# Patient Record
Sex: FEMALE | Race: WHITE | NOT HISPANIC OR LATINO | Employment: OTHER | URBAN - METROPOLITAN AREA
[De-identification: names, ages, dates, MRNs, and addresses within clinical notes are randomized per-mention and may not be internally consistent; named-entity substitution may affect disease eponyms.]

---

## 2020-07-30 ENCOUNTER — HISTORICAL (OUTPATIENT)
Dept: ADMINISTRATIVE | Facility: HOSPITAL | Age: 80
End: 2020-07-30

## 2021-10-22 ENCOUNTER — HISTORICAL (OUTPATIENT)
Dept: RADIOLOGY | Facility: HOSPITAL | Age: 81
End: 2021-10-22

## 2021-12-06 ENCOUNTER — PATIENT MESSAGE (OUTPATIENT)
Dept: RESEARCH | Facility: HOSPITAL | Age: 81
End: 2021-12-06

## 2021-12-07 ENCOUNTER — HISTORICAL (OUTPATIENT)
Dept: ADMINISTRATIVE | Facility: HOSPITAL | Age: 81
End: 2021-12-07

## 2021-12-07 LAB
ABS NEUT (OLG): 3.2 X10(3)/MCL (ref 2.1–9.2)
BASOPHILS # BLD AUTO: 0 X10(3)/MCL (ref 0–0.2)
BASOPHILS NFR BLD AUTO: 0 %
BUN SERPL-MCNC: 19.8 MG/DL (ref 9.8–20.1)
CALCIUM SERPL-MCNC: 9.2 MG/DL (ref 8.7–10.5)
CHLORIDE SERPL-SCNC: 105 MMOL/L (ref 98–107)
CO2 SERPL-SCNC: 28 MMOL/L (ref 23–31)
CREAT SERPL-MCNC: 1.12 MG/DL (ref 0.55–1.02)
CREAT/UREA NIT SERPL: 18
EOSINOPHIL # BLD AUTO: 0.2 X10(3)/MCL (ref 0–0.9)
EOSINOPHIL NFR BLD AUTO: 3 %
ERYTHROCYTE [DISTWIDTH] IN BLOOD BY AUTOMATED COUNT: 12.8 % (ref 11.5–17)
GLUCOSE SERPL-MCNC: 126 MG/DL (ref 82–115)
HCT VFR BLD AUTO: 36.9 % (ref 37–47)
HGB BLD-MCNC: 12.2 GM/DL (ref 12–16)
LYMPHOCYTES # BLD AUTO: 1.7 X10(3)/MCL (ref 0.6–4.6)
LYMPHOCYTES NFR BLD AUTO: 31 %
MCH RBC QN AUTO: 29.9 PG (ref 27–31)
MCHC RBC AUTO-ENTMCNC: 33.1 GM/DL (ref 33–36)
MCV RBC AUTO: 90.4 FL (ref 80–94)
MONOCYTES # BLD AUTO: 0.5 X10(3)/MCL (ref 0.1–1.3)
MONOCYTES NFR BLD AUTO: 9 %
NEUTROPHILS # BLD AUTO: 3.2 X10(3)/MCL (ref 2.1–9.2)
NEUTROPHILS NFR BLD AUTO: 57 %
PLATELET # BLD AUTO: 196 X10(3)/MCL (ref 130–400)
PMV BLD AUTO: 10.3 FL (ref 9.4–12.4)
POTASSIUM SERPL-SCNC: 3.6 MMOL/L (ref 3.5–5.1)
RBC # BLD AUTO: 4.08 X10(6)/MCL (ref 4.2–5.4)
SODIUM SERPL-SCNC: 142 MMOL/L (ref 136–145)
WBC # SPEC AUTO: 5.6 X10(3)/MCL (ref 4.5–11.5)

## 2021-12-17 LAB
ABS NEUT (OLG): 8.61 X10(3)/MCL (ref 2.1–9.2)
ALBUMIN SERPL-MCNC: 3.3 GM/DL (ref 3.4–4.8)
ALBUMIN/GLOB SERPL: 1.4 RATIO (ref 1.1–2)
ALP SERPL-CCNC: 85 UNIT/L (ref 40–150)
ALT SERPL-CCNC: 17 UNIT/L (ref 0–55)
AST SERPL-CCNC: 14 UNIT/L (ref 5–34)
BASOPHILS # BLD AUTO: 0.02 X10(3)/MCL (ref 0–0.2)
BASOPHILS NFR BLD AUTO: 0.2 % (ref 0–1)
BILIRUB SERPL-MCNC: 1.3 MG/DL (ref 0.2–1.2)
BILIRUBIN DIRECT+TOT PNL SERPL-MCNC: 0.5 MG/DL (ref 0–0.5)
BILIRUBIN DIRECT+TOT PNL SERPL-MCNC: 0.8 MG/DL (ref 0–0.8)
BUN SERPL-MCNC: 40.1 MG/DL (ref 9.8–20.1)
CALCIUM SERPL-MCNC: 9.1 MG/DL (ref 8.4–10.2)
CHLORIDE SERPL-SCNC: 99 MMOL/L (ref 98–107)
CO2 SERPL-SCNC: 30 MMOL/L (ref 23–31)
CREAT SERPL-MCNC: 1.29 MG/DL (ref 0.57–1.11)
DEPRECATED CALCIDIOL+CALCIFEROL SERPL-MC: 45.2 NG/ML (ref 30–80)
EOSINOPHIL # BLD AUTO: 0.04 X10(3)/MCL (ref 0–0.9)
EOSINOPHIL NFR BLD AUTO: 0.3 % (ref 0–6.4)
ERYTHROCYTE [DISTWIDTH] IN BLOOD BY AUTOMATED COUNT: 12.6 % (ref 11.5–17)
EST. AVERAGE GLUCOSE BLD GHB EST-MCNC: 171.4 MG/DL
GLOBULIN SER-MCNC: 2.3 GM/DL (ref 2.4–3.5)
GLUCOSE SERPL-MCNC: 226 MG/DL (ref 82–115)
HBA1C MFR BLD: 7.6 %
HCT VFR BLD AUTO: 38.8 % (ref 37–47)
HGB BLD-MCNC: 13 GM/DL (ref 12–16)
IMM GRANULOCYTES # BLD AUTO: 0.07 10*3/UL (ref 0–0.02)
IMM GRANULOCYTES NFR BLD AUTO: 0.6 % (ref 0–0.43)
LYMPHOCYTES # BLD AUTO: 2.31 X10(3)/MCL (ref 0.6–4.6)
LYMPHOCYTES NFR BLD AUTO: 19.2 % (ref 16–44)
MCH RBC QN AUTO: 30.4 PG (ref 27–31)
MCHC RBC AUTO-ENTMCNC: 33.5 GM/DL (ref 33–36)
MCV RBC AUTO: 90.7 FL (ref 80–94)
MONOCYTES # BLD AUTO: 1 X10(3)/MCL (ref 0.1–1.3)
MONOCYTES NFR BLD AUTO: 8.3 % (ref 4–12.1)
NEUTROPHILS # BLD AUTO: 8.61 X10(3)/MCL (ref 2.1–9.2)
NEUTROPHILS NFR BLD AUTO: 71.4 % (ref 43–73)
NRBC BLD AUTO-RTO: 0 % (ref 0–0.2)
PLATELET # BLD AUTO: 236 X10(3)/MCL (ref 130–400)
PMV BLD AUTO: 10.5 FL (ref 7.4–10.4)
POTASSIUM SERPL-SCNC: 3.9 MMOL/L (ref 3.5–5.1)
PREALB SERPL-MCNC: 21.7 MG/DL (ref 14–37)
PROT SERPL-MCNC: 5.6 GM/DL (ref 5.8–7.6)
RBC # BLD AUTO: 4.28 X10(6)/MCL (ref 4.2–5.4)
SODIUM SERPL-SCNC: 139 MMOL/L (ref 136–145)
TSH SERPL-ACNC: 0.49 UIU/ML (ref 0.35–4.94)
VIT B12 SERPL-MCNC: >2000 PG/ML (ref 213–816)
WBC # SPEC AUTO: 12 X10(3)/MCL (ref 4.5–11.5)

## 2021-12-18 LAB
ABS NEUT (OLG): 7.19 X10(3)/MCL (ref 2.1–9.2)
BASOPHILS # BLD AUTO: 0.02 X10(3)/MCL (ref 0–0.2)
BASOPHILS NFR BLD AUTO: 0.2 % (ref 0–1)
BUN SERPL-MCNC: 29.2 MG/DL (ref 9.8–20.1)
CALCIUM SERPL-MCNC: 9.1 MG/DL (ref 8.4–10.2)
CHLORIDE SERPL-SCNC: 101 MMOL/L (ref 98–107)
CO2 SERPL-SCNC: 31 MMOL/L (ref 23–31)
CREAT SERPL-MCNC: 1.2 MG/DL (ref 0.57–1.11)
CREAT/UREA NIT SERPL: 24
EOSINOPHIL # BLD AUTO: 0.25 X10(3)/MCL (ref 0–0.9)
EOSINOPHIL NFR BLD AUTO: 2 % (ref 0–6.4)
ERYTHROCYTE [DISTWIDTH] IN BLOOD BY AUTOMATED COUNT: 13 % (ref 11.5–17)
GLUCOSE SERPL-MCNC: 102 MG/DL (ref 82–115)
HCT VFR BLD AUTO: 42.8 % (ref 37–47)
HGB BLD-MCNC: 14 GM/DL (ref 12–16)
IMM GRANULOCYTES # BLD AUTO: 0.11 10*3/UL (ref 0–0.02)
IMM GRANULOCYTES NFR BLD AUTO: 0.9 % (ref 0–0.43)
LYMPHOCYTES # BLD AUTO: 3.59 X10(3)/MCL (ref 0.6–4.6)
LYMPHOCYTES NFR BLD AUTO: 28.9 % (ref 16–44)
MCH RBC QN AUTO: 30.4 PG (ref 27–31)
MCHC RBC AUTO-ENTMCNC: 32.7 GM/DL (ref 33–36)
MCV RBC AUTO: 93 FL (ref 80–94)
MONOCYTES # BLD AUTO: 1.25 X10(3)/MCL (ref 0.1–1.3)
MONOCYTES NFR BLD AUTO: 10.1 % (ref 4–12.1)
NEUTROPHILS # BLD AUTO: 7.19 X10(3)/MCL (ref 2.1–9.2)
NEUTROPHILS NFR BLD AUTO: 57.9 % (ref 43–73)
NRBC BLD AUTO-RTO: 0 % (ref 0–0.2)
PLATELET # BLD AUTO: 265 X10(3)/MCL (ref 130–400)
PMV BLD AUTO: 10 FL (ref 7.4–10.4)
POTASSIUM SERPL-SCNC: 4 MMOL/L (ref 3.5–5.1)
RBC # BLD AUTO: 4.6 X10(6)/MCL (ref 4.2–5.4)
SODIUM SERPL-SCNC: 144 MMOL/L (ref 136–145)
WBC # SPEC AUTO: 12.4 X10(3)/MCL (ref 4.5–11.5)

## 2021-12-20 LAB
ABS NEUT (OLG): 5.54 X10(3)/MCL (ref 2.1–9.2)
ALBUMIN SERPL-MCNC: 3.1 GM/DL (ref 3.4–4.8)
ALBUMIN/GLOB SERPL: 1.3 RATIO (ref 1.1–2)
ALP SERPL-CCNC: 91 UNIT/L (ref 40–150)
ALT SERPL-CCNC: 16 UNIT/L (ref 0–55)
AST SERPL-CCNC: 17 UNIT/L (ref 5–34)
BASOPHILS # BLD AUTO: 0.01 X10(3)/MCL (ref 0–0.2)
BASOPHILS NFR BLD AUTO: 0.1 % (ref 0–1)
BILIRUB SERPL-MCNC: 1.3 MG/DL (ref 0.2–1.2)
BILIRUBIN DIRECT+TOT PNL SERPL-MCNC: 0.4 MG/DL (ref 0–0.5)
BILIRUBIN DIRECT+TOT PNL SERPL-MCNC: 0.9 MG/DL (ref 0–0.8)
BUN SERPL-MCNC: 26 MG/DL (ref 9.8–20.1)
CALCIUM SERPL-MCNC: 9 MG/DL (ref 8.4–10.2)
CHLORIDE SERPL-SCNC: 104 MMOL/L (ref 98–107)
CHOLEST SERPL-MCNC: 135 MG/DL
CHOLEST/HDLC SERPL: 4 {RATIO} (ref 0–5)
CO2 SERPL-SCNC: 31 MMOL/L (ref 23–31)
CREAT SERPL-MCNC: 1.12 MG/DL (ref 0.57–1.11)
EOSINOPHIL # BLD AUTO: 0.4 X10(3)/MCL (ref 0–0.9)
EOSINOPHIL NFR BLD AUTO: 4.2 % (ref 0–6.4)
ERYTHROCYTE [DISTWIDTH] IN BLOOD BY AUTOMATED COUNT: 13.1 % (ref 11.5–17)
GLOBULIN SER-MCNC: 2.3 GM/DL (ref 2.4–3.5)
GLUCOSE SERPL-MCNC: 81 MG/DL (ref 82–115)
HCT VFR BLD AUTO: 40.8 % (ref 37–47)
HDLC SERPL-MCNC: 33 MG/DL (ref 40–60)
HGB BLD-MCNC: 12.9 GM/DL (ref 12–16)
IMM GRANULOCYTES # BLD AUTO: 0.09 10*3/UL (ref 0–0.02)
IMM GRANULOCYTES NFR BLD AUTO: 0.9 % (ref 0–0.43)
LDLC SERPL CALC-MCNC: 80 MG/DL (ref 50–140)
LYMPHOCYTES # BLD AUTO: 2.64 X10(3)/MCL (ref 0.6–4.6)
LYMPHOCYTES NFR BLD AUTO: 27.6 % (ref 16–44)
MCH RBC QN AUTO: 29.7 PG (ref 27–31)
MCHC RBC AUTO-ENTMCNC: 31.6 GM/DL (ref 33–36)
MCV RBC AUTO: 94 FL (ref 80–94)
MONOCYTES # BLD AUTO: 0.87 X10(3)/MCL (ref 0.1–1.3)
MONOCYTES NFR BLD AUTO: 9.1 % (ref 4–12.1)
NEUTROPHILS # BLD AUTO: 5.54 X10(3)/MCL (ref 2.1–9.2)
NEUTROPHILS NFR BLD AUTO: 58.1 % (ref 43–73)
NRBC BLD AUTO-RTO: 0 % (ref 0–0.2)
PLATELET # BLD AUTO: 223 X10(3)/MCL (ref 130–400)
PMV BLD AUTO: 10.1 FL (ref 7.4–10.4)
POTASSIUM SERPL-SCNC: 4.7 MMOL/L (ref 3.5–5.1)
PREALB SERPL-MCNC: 20.8 MG/DL (ref 14–37)
PROT SERPL-MCNC: 5.4 GM/DL (ref 5.8–7.6)
RBC # BLD AUTO: 4.34 X10(6)/MCL (ref 4.2–5.4)
SODIUM SERPL-SCNC: 143 MMOL/L (ref 136–145)
TRIGL SERPL-MCNC: 112 MG/DL (ref 0–150)
VLDLC SERPL CALC-MCNC: 22 MG/DL
WBC # SPEC AUTO: 9.6 X10(3)/MCL (ref 4.5–11.5)

## 2021-12-23 ENCOUNTER — HISTORICAL (OUTPATIENT)
Dept: ADMINISTRATIVE | Facility: HOSPITAL | Age: 81
End: 2021-12-23

## 2021-12-23 LAB
ABS NEUT (OLG): 3.92 X10(3)/MCL (ref 2.1–9.2)
ALBUMIN SERPL-MCNC: 3.2 GM/DL (ref 3.4–4.8)
ALBUMIN/GLOB SERPL: 1.4 RATIO (ref 1.1–2)
ALP SERPL-CCNC: 99 UNIT/L (ref 40–150)
ALT SERPL-CCNC: 18 UNIT/L (ref 0–55)
AST SERPL-CCNC: 16 UNIT/L (ref 5–34)
BASOPHILS # BLD AUTO: 0.02 X10(3)/MCL (ref 0–0.2)
BASOPHILS NFR BLD AUTO: 0.3 % (ref 0–1)
BILIRUB SERPL-MCNC: 1.3 MG/DL (ref 0.2–1.2)
BILIRUBIN DIRECT+TOT PNL SERPL-MCNC: 0.5 MG/DL (ref 0–0.5)
BILIRUBIN DIRECT+TOT PNL SERPL-MCNC: 0.8 MG/DL (ref 0–0.8)
BUN SERPL-MCNC: 22.8 MG/DL (ref 9.8–20.1)
CALCIUM SERPL-MCNC: 9.3 MG/DL (ref 8.4–10.2)
CHLORIDE SERPL-SCNC: 104 MMOL/L (ref 98–107)
CO2 SERPL-SCNC: 31 MMOL/L (ref 23–31)
CREAT SERPL-MCNC: 1.16 MG/DL (ref 0.57–1.11)
EOSINOPHIL # BLD AUTO: 0.28 X10(3)/MCL (ref 0–0.9)
EOSINOPHIL NFR BLD AUTO: 3.8 % (ref 0–6.4)
ERYTHROCYTE [DISTWIDTH] IN BLOOD BY AUTOMATED COUNT: 13.2 % (ref 11.5–17)
GLOBULIN SER-MCNC: 2.3 GM/DL (ref 2.4–3.5)
GLUCOSE SERPL-MCNC: 189 MG/DL (ref 82–115)
HCT VFR BLD AUTO: 36.7 % (ref 37–47)
HGB BLD-MCNC: 11.8 GM/DL (ref 12–16)
IMM GRANULOCYTES # BLD AUTO: 0.03 10*3/UL (ref 0–0.02)
IMM GRANULOCYTES NFR BLD AUTO: 0.4 % (ref 0–0.43)
LYMPHOCYTES # BLD AUTO: 2.2 X10(3)/MCL (ref 0.6–4.6)
LYMPHOCYTES NFR BLD AUTO: 29.9 % (ref 16–44)
MCH RBC QN AUTO: 29.2 PG (ref 27–31)
MCHC RBC AUTO-ENTMCNC: 32.2 GM/DL (ref 33–36)
MCV RBC AUTO: 90.8 FL (ref 80–94)
MONOCYTES # BLD AUTO: 0.91 X10(3)/MCL (ref 0.1–1.3)
MONOCYTES NFR BLD AUTO: 12.4 % (ref 4–12.1)
NEUTROPHILS # BLD AUTO: 3.92 X10(3)/MCL (ref 2.1–9.2)
NEUTROPHILS NFR BLD AUTO: 53.2 % (ref 43–73)
NRBC BLD AUTO-RTO: 0 % (ref 0–0.2)
PLATELET # BLD AUTO: 190 X10(3)/MCL (ref 130–400)
PMV BLD AUTO: 10.5 FL (ref 7.4–10.4)
POTASSIUM SERPL-SCNC: 4.7 MMOL/L (ref 3.5–5.1)
PROT SERPL-MCNC: 5.5 GM/DL (ref 5.8–7.6)
RBC # BLD AUTO: 4.04 X10(6)/MCL (ref 4.2–5.4)
SODIUM SERPL-SCNC: 142 MMOL/L (ref 136–145)
WBC # SPEC AUTO: 7.4 X10(3)/MCL (ref 4.5–11.5)

## 2021-12-24 LAB
HCT VFR BLD AUTO: 37.2 % (ref 37–47)
HGB BLD-MCNC: 12.1 GM/DL (ref 12–16)

## 2021-12-27 LAB
ABS NEUT (OLG): 3.88 X10(3)/MCL (ref 2.1–9.2)
ALBUMIN SERPL-MCNC: 3.2 GM/DL (ref 3.4–4.8)
ALBUMIN/GLOB SERPL: 1.5 RATIO (ref 1.1–2)
ALP SERPL-CCNC: 124 UNIT/L (ref 40–150)
ALT SERPL-CCNC: 18 UNIT/L (ref 0–55)
AST SERPL-CCNC: 17 UNIT/L (ref 5–34)
BASOPHILS # BLD AUTO: 0.03 X10(3)/MCL (ref 0–0.2)
BASOPHILS NFR BLD AUTO: 0.4 % (ref 0–1)
BILIRUB SERPL-MCNC: 0.6 MG/DL (ref 0.2–1.2)
BILIRUBIN DIRECT+TOT PNL SERPL-MCNC: 0.2 MG/DL (ref 0–0.5)
BILIRUBIN DIRECT+TOT PNL SERPL-MCNC: 0.4 MG/DL (ref 0–0.8)
BUN SERPL-MCNC: 16.7 MG/DL (ref 9.8–20.1)
CALCIUM SERPL-MCNC: 9.5 MG/DL (ref 8.4–10.2)
CHLORIDE SERPL-SCNC: 104 MMOL/L (ref 98–107)
CO2 SERPL-SCNC: 29 MMOL/L (ref 23–31)
CREAT SERPL-MCNC: 1.06 MG/DL (ref 0.57–1.11)
EOSINOPHIL # BLD AUTO: 0.26 X10(3)/MCL (ref 0–0.9)
EOSINOPHIL NFR BLD AUTO: 3.8 % (ref 0–6.4)
ERYTHROCYTE [DISTWIDTH] IN BLOOD BY AUTOMATED COUNT: 13.1 % (ref 11.5–17)
EST CREAT CLEARANCE SER (OHS): 40.51 ML/MIN
GLOBULIN SER-MCNC: 2.2 GM/DL (ref 2.4–3.5)
GLUCOSE SERPL-MCNC: 104 MG/DL (ref 82–115)
HCT VFR BLD AUTO: 36.3 % (ref 37–47)
HGB BLD-MCNC: 11.6 GM/DL (ref 12–16)
IMM GRANULOCYTES # BLD AUTO: 0.01 10*3/UL (ref 0–0.02)
IMM GRANULOCYTES NFR BLD AUTO: 0.1 % (ref 0–0.43)
LYMPHOCYTES # BLD AUTO: 1.98 X10(3)/MCL (ref 0.6–4.6)
LYMPHOCYTES NFR BLD AUTO: 29 % (ref 16–44)
MCH RBC QN AUTO: 29.2 PG (ref 27–31)
MCHC RBC AUTO-ENTMCNC: 32 GM/DL (ref 33–36)
MCV RBC AUTO: 91.4 FL (ref 80–94)
MONOCYTES # BLD AUTO: 0.67 X10(3)/MCL (ref 0.1–1.3)
MONOCYTES NFR BLD AUTO: 9.8 % (ref 4–12.1)
NEUTROPHILS # BLD AUTO: 3.88 X10(3)/MCL (ref 2.1–9.2)
NEUTROPHILS NFR BLD AUTO: 56.9 % (ref 43–73)
NRBC BLD AUTO-RTO: 0 % (ref 0–0.2)
PLATELET # BLD AUTO: 180 X10(3)/MCL (ref 130–400)
PMV BLD AUTO: 10.3 FL (ref 7.4–10.4)
POTASSIUM SERPL-SCNC: 4.2 MMOL/L (ref 3.5–5.1)
PREALB SERPL-MCNC: 20.1 MG/DL (ref 14–37)
PROT SERPL-MCNC: 5.4 GM/DL (ref 5.8–7.6)
RBC # BLD AUTO: 3.97 X10(6)/MCL (ref 4.2–5.4)
SODIUM SERPL-SCNC: 143 MMOL/L (ref 136–145)
WBC # SPEC AUTO: 6.8 X10(3)/MCL (ref 4.5–11.5)

## 2022-02-08 ENCOUNTER — HISTORICAL (OUTPATIENT)
Dept: PREADMISSION TESTING | Facility: HOSPITAL | Age: 82
End: 2022-02-08

## 2022-02-09 LAB — SARS-COV-2 RNA RESP QL NAA+PROBE: NOT DETECTED

## 2022-02-11 ENCOUNTER — HISTORICAL (OUTPATIENT)
Dept: SURGERY | Facility: HOSPITAL | Age: 82
End: 2022-02-11

## 2022-02-11 LAB
CBG: 202 (ref 70–115)
CBG: 286 (ref 70–115)

## 2022-02-12 LAB
ABS NEUT (OLG): 4.86 (ref 2.1–9.2)
BASOPHILS # BLD AUTO: 0 10*3/UL (ref 0–0.2)
BASOPHILS NFR BLD AUTO: 0 %
EOSINOPHIL # BLD AUTO: 0.4 10*3/UL (ref 0–0.9)
EOSINOPHIL NFR BLD AUTO: 4 %
ERYTHROCYTE [DISTWIDTH] IN BLOOD BY AUTOMATED COUNT: 12.6 % (ref 11.5–17)
HCT VFR BLD AUTO: 37.4 % (ref 37–47)
HGB BLD-MCNC: 11.8 G/DL (ref 12–16)
LYMPHOCYTES # BLD AUTO: 1.9 10*3/UL (ref 0.6–4.6)
LYMPHOCYTES NFR BLD AUTO: 24 %
MANUAL DIFF? (OHS): NO
MCH RBC QN AUTO: 29.5 PG (ref 27–31)
MCHC RBC AUTO-ENTMCNC: 31.6 G/DL (ref 33–36)
MCV RBC AUTO: 93.5 FL (ref 80–94)
MONOCYTES # BLD AUTO: 0.7 10*3/UL (ref 0.1–1.3)
MONOCYTES NFR BLD AUTO: 9 %
NEUTROPHILS # BLD AUTO: 4.86 10*3/UL (ref 2.1–9.2)
NEUTROPHILS NFR BLD AUTO: 62 %
PLATELET # BLD AUTO: 201 10*3/UL (ref 130–400)
PMV BLD AUTO: 10.2 FL (ref 9.4–12.4)
RBC # BLD AUTO: 4 10*6/UL (ref 4.2–5.4)
WBC # SPEC AUTO: 7.8 10*3/UL (ref 4.5–11.5)

## 2022-04-08 ENCOUNTER — HISTORICAL (OUTPATIENT)
Dept: ADMINISTRATIVE | Facility: HOSPITAL | Age: 82
End: 2022-04-08

## 2022-04-10 ENCOUNTER — HISTORICAL (OUTPATIENT)
Dept: ADMINISTRATIVE | Facility: HOSPITAL | Age: 82
End: 2022-04-10

## 2022-04-27 VITALS
SYSTOLIC BLOOD PRESSURE: 128 MMHG | DIASTOLIC BLOOD PRESSURE: 72 MMHG | WEIGHT: 213.88 LBS | BODY MASS INDEX: 36.51 KG/M2 | HEIGHT: 64 IN

## 2022-04-30 NOTE — DISCHARGE SUMMARY
Patient:   Janee Costello             MRN: [id number]            FIN: [id number]               Age:   81 years     Sex:  Female     :  1940   Associated Diagnoses:   None   Author:   Arina Varner      Date of Service: 2021      Discharge Information      Discharge Summary Information   Date of Admission: 2021  Date of Discharge: 2021  Admit Diagnosis: Cervical spondylosis with myelopathy         Atrial fibrillation         CAD         Hypothyroidism         Obesity         Hyperlipidemia         Vertigo         Vitamin D deficiency         Vitamin B12 deficiency         Diabetes type 1         Hypertension         Right knee arthritis         Constipation  Discharge Diagnosis: Cervical spondylosis with myelopathy (stable)            Atrial fibrillation (stable)            CAD (stable)            Hypothyroidism (stable)            Obesity (stable)            Hyperlipidemia (stable)            Vertigo (stable)            Vitamin D deficiency (stable)            Vitamin B12 deficiency (stable)            Diabetes type 1 (stable)            Hypertension (stable)            Right knee arthritis (stable)            Constipation (stable)            Cholelithiasis (stable)     COVID-19 testing: Negative on 12/15/2021  COVID-19 vaccination status: Vaccinated - Pfizer 1/10/2021 and 2021    Internal Medicine (attending): Jones Raya MD  Physiatry (consulting): Jeff Martinez MD    OUTPATIENT PROVIDERS  PCP: Leigha Brown MD  Neurosurgery: Jam Thurstno MD  Cardiology: Kyle Turcios MD      Hospital Course   81-year-old WF presented to [facility] for scheduled back surgery 2/2 cervical spondylosis with cord compression.  PMH significant for right knee arthritis, atrial fibrillation, CAD, diabetes type 2, hypothyroidism, and hypertension.  On 12/10 tolerated ACDF C4-7 without perioperative complications.  Removed RADHA drain on .  On  reported more difficulty with  swallowing pills.  Initiated Decadron IVP x1 per neurosurgery.  Tolerated transfer to [facility] inpatient rehab unit on 12/16 without incident.    Throughout inpatient rehab course remained continent of bowel and bladder.  On 12/17 initiated amlodipine 5 mg daily.  Renal indices trended up slightly.  Initiated 1 L normal saline.  The remainder of rehab course renal indices improved.  On 12/20 required increase in amlodipine to 10 mg daily with improvement in blood pressure control.  Insulin pump began malfunctioning and discontinued.  Endocrinology recommended initiating Lantus 35 units at bedtime and insulin lispro 6 units 3 times a day prior to meals.  On 12/22 asymptomatic bradycardia when sleeping and decrease metoprolol to 25 mg daily.  Vital signs remained at goal throughout remainder of inpatient rehab course.  Reported abdominal pain on 12/22 improved after GI cocktail.  Abdominal ultrasound significant for previously diagnosed cholelithiasis, no ductal dilatation.  Abdominal pain resolved.  On 12/23 insulin pump began working.  Discontinued Lantus and insulin lispro along with ISS as #3.  Throughout remainder of inpatient rehab course CBGs remained at goal.  Bowel management, sleep hygiene, and appetite remained at goal.  RT reported overall supervision to set up an limited by standing tolerance.  PT reported overall standby assist/independent.  Ambulating 200 feet.  OT reported minimal to supervision set up assist with ADLs 2/2 decreased hand function.  Vital signs at goal.  Medication reconciliation completed.  Discharge orders initiated.  Stable for transfer home with home health and family care.  Follow-up with scheduled appointments documented below.     Chief Complaint: Cervical spondylosis with myelopathy s/p ACDF with allograft C4-7 on 12/10/2021       Physical Examination      Vital Signs (last 24 hrs)_____  Last Charted___________  Temp Oral      36.5 DegC  (DEC 28 05:16)  Heart Rate Peripheral   67  bpm  (DEC 28 05:16)  Resp Rate          18 br/min  (DEC 28 05:16)  SBP      H 145mmHg  (DEC 28 05:16)  DBP      72 mmHg  (DEC 28 05:16)  SpO2      94 %  (DEC 28 05:16)     General:  Alert and oriented, No acute distress.    Eye:  Pupils are equal, round and reactive to light, Vision unchanged.    HENT:  Normocephalic.    Neck:  Supple, Non-tender.    Respiratory:  Lungs are clear to auscultation, Respirations are non-labored, Breath sounds are equal.    Cardiovascular:  Normal rate, Regular rhythm, No murmur, No edema.    Gastrointestinal:  Soft, Non-tender, Normal bowel sounds.    Musculoskeletal:  Normal range of motion, Lower extremity weakness bilaterally, Bilateral upper extremity weakness right more than left, Decreased fine motor skills.  .    Integumentary:  Warm, Right clavicle surgical incision dry and intact, soft collar in place.  .    Neurologic:  Alert, Oriented, Normal sensory, Normal motor function, No focal deficits, Cranial Nerves II-XII are grossly intact.    Cognition and Speech:  Oriented, Speech clear and coherent, Functional cognition intact.    Psychiatric:  Cooperative, Appropriate mood & affect, Normal judgment, Non-suicidal.        Results Review   General results   Most recent results   Discrete results only   12/27/2021 6:11 CST      Est Creat Clearance Ser   40.51 mL/min    12/27/2021 5:00 CST      Blood Glucose, POC        150 mg/dL  HI    12/27/2021 4:55 CST      WBC                       6.8 x10(3)/mcL                             RBC                       3.97 x10(6)/mcL  LOW                             Hgb                       11.6 gm/dL  LOW                             Hct                       36.3 %  LOW                             Platelet                  180 x10(3)/mcL                             MCV                       91.4 fL                             MCH                       29.2 pg                             MCHC                      32.0 gm/dL  LOW                              RDW                       13.1 %                             MPV                       10.3 fL                             Abs Neut                  3.88 x10(3)/mcL                             Neutro Auto               56.9 %                             Lymph Auto                29.0 %                             Mono Auto                 9.8 %                             Eos Auto                  3.8 %                             Abs Eos                   0.26 x10(3)/mcL                             Basophil Auto             0.4 %                             Abs Neutro                3.88 x10(3)/mcL                             Abs Lymph                 1.98 x10(3)/mcL                             Abs Mono                  0.67 x10(3)/mcL                             Abs Baso                  0.03 x10(3)/mcL                             NRBC%                     0.0 %                             IG%                       0.100 %                             IG#                       0.0100                             Sodium Lvl                143 mmol/L                             Potassium Lvl             4.2 mmol/L                             Chloride                  104 mmol/L                             CO2                       29 mmol/L                             Calcium Lvl               9.5 mg/dL                             Glucose Lvl               104 mg/dL                             BUN                       16.7 mg/dL                             Creatinine                1.06 mg/dL                             eGFR-AA                   >60  NA                             eGFR-RUDI                  53 mL/min/1.73 m2  NA                             Bili Total                0.6 mg/dL                             Bili Direct               0.2 mg/dL                             Bili Indirect             0.40 mg/dL                             AST                       17 unit/L                              ALT                       18 unit/L                             Alk Phos                  124 unit/L                             Total Protein             5.4 gm/dL  LOW                             Albumin Lvl               3.2 gm/dL  LOW                             Globulin                  2.2 gm/dL  LOW                             A/G Ratio                 1.5 ratio                             Prealbumin                20.1 mg/dL        Radiology results   US, Abdomen , Reported at  12/23/2021 10:53:00, Interpretation:  Prominent liver at 18.8 cm. Changes suggestive of some degree of steatosis. Cholelithiasis       Discharge Plan   Discharge Summary Plan   Discharge Status: improved.        Location: Discharge to home with home health     Medications: See discharge medicine reconciliation     Activity: As tolerated     Diet: Diabetic diet     Instructions:  Take all medications as prescribed.          Attend appointments as scheduled.          Return to ED if symptoms worsen, or if t > 100.4.     Education: cervical spondylosis with myelopathy, atrial fibrillation, CAD     Follow-up: Leigha mcmahon MD within 1 to 2 weeks         Jam Thurston MD on 01/20/2022 at 9:00    Discussed plan of care, and patient communicated understanding. Agreed to comply with recommendations.    Discharge Time: 54 minutes

## 2022-05-03 NOTE — HISTORICAL [facility] CERNER
This is a historical note converted from Katlyn. Formatting and pictures may have been removed.  Please reference Katlyn for original formatting and attached multimedia. Chief Complaint  pt here for bilateral knee pain, prior left patella fx with sx 2014 and prior left TKA in 2016, states fell back in March 2020 and knees have hurt ever since, xrays today....sm  History of Present Illness  Knee symptoms ::knee gives way ??? Knee joint pain on the right ??? Worse with weightbearing ??? Worse in the morning  ??? Slowly worsens with extended activity ??? Is increased by bending it ??? By twisting it ??? By kneeling ??? With stairs ??? By squatting  ??? Knee joint swelling on the right??  ?? Knee joint pain not improved by rest ? ??? No clicking sensation in the right knee ??? No grating sensation in the right knee?  ??? The knee did not suddenly buckle due to contact ?? No popping sound was heard in the knee?  ??? No tingling ??? No burning sensation  Review of Systems  Review of Systems?  ?????Constitutional: ?No fever, No chills. ?  ?????Respiratory: ?No shortness of breath, No cough. ?  ?????Cardiovascular: ?No chest pain. ?  ?????Gastrointestinal: ?No nausea, No vomiting, No diarrhea, No constipation, No heartburn. ?  ?????Genitourinary: ?No dysuria, No hematuria. ?  ?????Hematology/Lymphatics: ?No bleeding tendency. ?  ?????Endocrine: ?No polyuria. ?  ?????Neurologic: ?Alert and oriented X4, No numbness, No tingling. ?  ?????Psychiatric: ?No anxiety, No depression. ?  ?????Integumentary: no abnormalities except as noted in history of present illness  Physical Exam  Vitals & Measurements  T:?98.8? ?F (Oral)? BP:?128/72?  HT:?162.00?cm? WT:?97.000?kg? BMI:?36.96?  PHYSICAL FINDINGS  Cardiovascular:  Arterial Pulses: ? Dorsalis pedis pulses were normal right. ? Dorsalis pedis pulses were normal right.  Musculoskeletal System:  Hips:  General/bilateral: ? No swelling of the hips. ? No induration of the hips.  Right  Hip: ? Motion was normal. ? No pain was elicited by active internal rotation with the hip flexed.  ? No pain was elicited by active external rotation with the hip flexed. ? No pain was elicited by active motion. ? No pain was elicited by passive motion.  Left Hip: ? Motion was normal.  Right Knee: ? Examined. ? Positive effusion. ?Localized swelling. ?Genu varum. ?Patella demonstrated crepitus. ?Anteromedial aspect was tender on palpation. ?Medial aspect was tender on palpation.  patella  Right Knee:  Knee Motion: Value Normal Range  Active flexion???????120?? degrees  Active extension??????10??degrees  ???  ? Pain was elicited throughout the range of motion. ? Swelling with a negative fluctuation test. ? No erythema. ? No warmth. ? Anterior aspect was not tender on palpation. ? Patellofemoral region was not tender on palpation. ? Medial collateral ligament was not tender on palpation. ? Lateral collateral ligament was not tender on palpation. ? No pain was elicited by motion using Aurora apprehension test. ? No laxity of the posterior cruciate ligament. ? No anterior drawer sign was present. ? No one plane medial (straight) instability. ? No one plane lateral (straight) instability. ? A Lachman test did not demonstrate one plane anterior instability. ? Clarkes sign was not observed for chondromalacia.  Left Knee:  Knee Motion: Value Normal Range  Active flexion??????120??? ?degrees  Active extension????5 degrees  ???  ??  TESTS  Imaging:  X-Ray Knee:  AP and lateral view x-rays of the right knee with sunrise view of the patella were performed -of right knee.  ???  IMPRESSIONS RADIOLOGY TEST  Narrowing of the right knee joint space bone on bone, showed sclerosis of the right knee, and osteophytes arising from the right knee.  ?  ?   left knee has intact prosthesis  ?   she can not use NSAIDS due to her anti-coagulation meds  can not use cortisone due to her diabetes  she does not want to try  visco-supplementation  ?   only option for her is TKA when she decides  ?  Assessment/Plan  1.?Arthritis of right knee?M17.11  2.?Status post left knee replacement?Z96.652  Referrals  Clinic Follow up, 07/30/20 13:32:00 CDT, prn, Arthritis of right knee  Status post left knee replacement, [facility]   Problem List/Past Medical History  Ongoing  Arthritis of right knee  Diabetes type  Obesity  Status post left knee replacement  Historical  No qualifying data  Medications  Baqsimi One Pack 3 mg nasal powder, 3 mg, Nasal, Once  diltiazem 120 mg/24 hours oral CAPsule extended release, 120 mg= 1 cap(s), Oral, Daily  Eliquis Starter Pack for Treatment of DVT and PE 5 mg oral tablet, 5 mg= 1 tab(s), Oral, BID  gabapentin 100 mg oral capsule, 100 mg= 1 cap(s), Oral, BID  levothyroxine 50 mcg (0.05 mg) oral tablet, 50 mcg= 1 tab(s), Oral, Daily  metoprolol succinate 50 mg oral tablet, extended release, 75 mg= 1.5 tab(s), Oral, Daily  pravastatin 20 mg oral tablet, 20 mg= 1 tab(s), Oral, Daily  ReliOn/NovoLIN R FlexPen 100 units/mL injectable solution, Subcutaneous  traMADol 50 mg oral tablet, 50 mg= 1 tab(s), Oral, q6hr, PRN  Allergies  lisinopril?(Swelling)  sulfa drugs?(Rash)  Social History  Tobacco  Never (less than 100 in lifetime), N/A, 07/30/2020  Health Maintenance  Health Maintenance  ???Pending?(in the next year)  ??? ??OverDue  ??? ? ? ?Advance Directive due??01/02/20??and every 1??year(s)  ??? ? ? ?Cognitive Screening due??01/02/20??and every 1??year(s)  ??? ? ? ?Functional Assessment due??01/02/20??and every 1??year(s)  ??? ??Due?  ??? ? ? ?ADL Screening due??08/04/20??and every 1??year(s)  ??? ? ? ?Aspirin Therapy for CVD Prevention due??08/04/20??and every 1??year(s)  ??? ? ? ?Bone Density Screening due??08/04/20??Variable frequency  ??? ? ? ?Depression Screening due??08/04/20??and every?  ??? ? ? ?Diabetes Maintenance-Fasting Lipid Profile due??08/04/20??Variable frequency  ??? ? ? ?Diabetes  Maintenance-Serum Creatinine due??08/04/20??Variable frequency  ??? ? ? ?Diabetes Screening due??08/04/20??and every?  ??? ? ? ?Influenza Vaccine due??08/04/20??and every?  ??? ? ? ?Lipid Screening due??08/04/20??and every?  ??? ? ? ?Pneumococcal Vaccine due??08/04/20??and every?  ??? ? ? ?Tetanus Vaccine due??08/04/20??and every 10??year(s)  ??? ? ? ?Zoster Vaccine due??08/04/20??and every?  ??? ??Due In Future?  ??? ? ? ?Obesity Screening not due until??01/01/21??and every 1??year(s)  ??? ? ? ?Fall Risk Assessment not due until??01/02/21??and every 1??year(s)  ??? ? ? ?Blood Pressure Screening not due until??07/30/21??and every 1??year(s)  ??? ? ? ?Body Mass Index Check not due until??07/30/21??and every 1??year(s)  ???Satisfied?(in the past 1 year)  ??? ??Satisfied?  ??? ? ? ?Blood Pressure Screening on??07/30/20.??Satisfied by Ara Chinchilla LPN  ??? ? ? ?Body Mass Index Check on??07/30/20.??Satisfied by Ara Chinchilla LPN  ??? ? ? ?Fall Risk Assessment on??07/30/20.??Satisfied by Ara Chinchilla LPN  ??? ? ? ?Obesity Screening on??07/30/20.??Satisfied by Ara Chinchilla LPN  ?

## 2022-05-05 ENCOUNTER — TELEPHONE (OUTPATIENT)
Dept: NEUROSURGERY | Facility: CLINIC | Age: 82
End: 2022-05-05

## 2022-05-14 NOTE — H&P
Patient:   Janee Costello             MRN: [id number]            FIN: [id number]               Age:   81 years     Sex:  Female     :  1940   Associated Diagnoses:   Cholelithiasis   Author:   Arina Juarez      Basic Information   Source of history:  Self.    History limitation:  None.       Chief Complaint   Abd pain      History of Present Illness   Ms. Costello is a 80 yo female that presents to [facility] for elective cholecystectomy. hx of symptomatic cholelithiasis. No new symptoms. ready to proceed.       Review of Systems   Constitutional:  Negative.    Eye:  Negative.    Ear/Nose/Mouth/Throat:  Negative.    Respiratory:  Negative.    Cardiovascular:  Negative.    Gastrointestinal:  Nausea, Abdominal pain.    Genitourinary:  Negative.    Gynecologic:  Negative.    Hematology/Lymphatics:  Negative.    Endocrine:  Negative.    Immunologic:  Negative.    Musculoskeletal:  Negative.    Integumentary:  Negative.    Neurologic:  Negative.    Psychiatric:  Negative.       Health Status   Allergies:    Allergic Reactions (Selected)  Severity Not Documented  Lisinopril- Swelling.  Sulfa drugs- Rash.   Current medications:  (Selected)   Inpatient Medications  Ordered  Ancef: 1 gm, form: Injection Surgical prophylaxis, IV Piggyback, On Call, Infuse over: 30 minute(s), first dose 22 6:10:00 CST  Lactated Ringers Injection intravenous solution 1,000 mL: 1,000 mL, 1,000 mL, IV, 75 mL/hr, start date 22 6:10:00 CST  Pending Complete  midazolam: 2 mg, form: Injection, IV Push, q5min, Order duration: 2 dose(s), first dose 22 6:15:00 CST, stop date 22 6:24:00 CST, (up to 5 mg for moderate anxiety)  Prescriptions  Prescribed  Vitamin D3 1000 intl units (25 mcg) oral tablet: 25 mcg = 1 tab(s), Oral, Daily, # 30 tab(s), 0 Refill(s), Pharmacy: [facility] #[id number], 168, cm, Height/Length Dosing, 21 14:40:00 CST, 102, kg, Weight Dosing, 21 14:40:00 CST  amlodipine 5 mg oral  tablet: 10 mg = 2 tab(s), Oral, Daily, # 60 tab(s), 0 Refill(s), Pharmacy: [facility] #[id number], 168, cm, Height/Length Dosing, 12/16/21 14:40:00 CST, 102, kg, Weight Dosing, 12/16/21 14:40:00 CST  apixaban 5 mg oral tablet: 5 mg = 1 tab(s), Oral, BID, # 60 tab(s), 0 Refill(s), Pharmacy: [facility] #[id number], 168, cm, Height/Length Dosing, 12/16/21 14:40:00 CST, 102, kg, Weight Dosing, 12/16/21 14:40:00 CST  cloniDINE 0.1 mg oral tablet: 0.2 mg = 2 tab(s), Oral, BID, # 120 tab(s), 0 Refill(s), Pharmacy: [facility] #[id number], 168, cm, Height/Length Dosing, 12/16/21 14:40:00 CST, 102, kg, Weight Dosing, 12/16/21 14:40:00 CST  diltiazem 120 mg/24 hours oral CAPsule extended release: 120 mg = 1 cap(s), Oral, Daily, # 30 cap(s), 0 Refill(s), Pharmacy: [facility] #[id number], 168, cm, Height/Length Dosing, 12/16/21 14:40:00 CST, 102, kg, Weight Dosing, 12/16/21 14:40:00 CST  furosemide 40 mg oral tablet: 40 mg = 1 tab(s), Oral, BID, # 60 tab(s), 0 Refill(s), Pharmacy: [facility] #[id number], 168, cm, Height/Length Dosing, 12/16/21 14:40:00 CST, 102, kg, Weight Dosing, 12/16/21 14:40:00 CST  gabapentin 300 mg oral capsule: 300 mg = 1 cap(s), Oral, TID, # 90 cap(s), 0 Refill(s), Pharmacy: [facility] #[id number], 168, cm, Height/Length Dosing, 12/16/21 14:40:00 CST, 102, kg, Weight Dosing, 12/16/21 14:40:00 CST  levothyroxine 50 mcg (0.05 mg) oral tablet: 50 mcg = 1 tab(s), Oral, Daily, # 30 tab(s), 0 Refill(s), Pharmacy: [facility] #[id number], 168, cm, Height/Length Dosing, 12/16/21 14:40:00 CST, 102, kg, Weight Dosing, 12/16/21 14:40:00 CST  metoprolol succinate 25 mg oral tablet extended release: 25 mg = 1 tab(s), Oral, Daily, # 30 tab(s), 0 Refill(s), Pharmacy: [facility] #[id number], 168, cm, Height/Length Dosing, 12/16/21 14:40:00 CST, 102, kg, Weight Dosing, 12/16/21 14:40:00 CST  pravastatin 20 mg oral tablet: 20 mg = 1 tab(s), Oral, Daily, # 30 tab(s), 0 Refill(s), Pharmacy: [facility] #[id number], 168, cm,  Height/Length Dosing, 12/16/21 14:40:00 CST, 102, kg, Weight Dosing, 12/16/21 14:40:00 CST  Documented Medications  Documented  Decara 625 mcg (25,000 intl units) oral capsule: 625 mcg = 1 cap(s), Oral, qWeek  ReliOn/NovoLIN R FlexPen 100 units/mL injectable solution: units, 0-100 units, Subcutaneous, Daily  acetaminophen-hydrocodone 325 mg-10 mg oral tablet: 1 tab(s), Oral, QID  cloniDINE 0.2 mg oral tablet: 0.2 mg = 1 tab(s), Oral, BID  promethazine 25 mg oral tablet: 25 mg = 1 tab(s), Oral, q4-6hr   Problem list:    All Problems  Diabetes type / SNOMED CT 8817789081 / Confirmed  Obesity / SNOMED CT 0516588710 / Probable  Arthritis of right knee / SNOMED CT 9357295554 / Confirmed  Status post left knee replacement / SNOMED CT 0977588209 / Confirmed  Diabetes mellitus / SNOMED CT 733216523 / Confirmed  Atrial fibrillation / SNOMED CT 44661321 / Confirmed  Hypertension / SNOMED CT 33941734 / Confirmed  Hyperlipidemia / SNOMED CT 42247186 / Confirmed  Hyperthyroidism / SNOMED CT 03992615 / Confirmed  CAD - Coronary artery disease / SNOMED CT 1131761478 / Confirmed  Cervical spinal stenosis / SNOMED CT 140040754 / Confirmed  Paresthesia (numbness/tingling) of arm / SNOMED CT 6532433599 / Confirmed  Low back pain / SNOMED CT 682691254 / Confirmed  Hip pain / SNOMED CT 76870493 / Confirmed  Pain in both legs / SNOMED CT 413293560753246 / Confirmed  Coronary artery disease / SNOMED CT 1703840999 / Confirmed  Stented coronary artery / SNOMED CT 6438915557 / Confirmed      Histories   Past Medical History:    Active  Diabetes type (5261932235)  Resolved  History of kidney stone (2627455630):  Resolved.   Family History:    No family history items have been selected or recorded.   Procedure history:    Cervical Fusion Anterior (.) performed by Bertrand NORRIS, Jam Rosario on 12/10/2021 at 81 Years.  Comments:  12/10/2021 11:01 CST - Maximo WALSH, Brenda Khan  auto-populated from documented surgical case  Colonoscopy (SNOMED CT  749050548) in the month of 2/2020 at 79 Years.  Repair of colovaginal fistula (SNOMED CT 29071733) in the month of 2/2020 at 79 Years.  Knee replacement in the month of 6/2016 at 75 Years.  Comments:  12/9/2021 9:54 JOSHUA - Shakira Estes RN  left  Repair of patella (SNOMED CT 616905099) in the month of 3/2014 at 73 Years.  Appendectomy (SNOMED CT 308167429) in the month of 6/2012 at 71 Years.  Placement of stent in cardiac conduit (SNOMED CT 2588151450) in 2010 at 70 Years.  Cataract surgery (SNOMED CT 484786056) in the month of 7/1998 at 57 Years.  Hysterectomy in the month of 1/1973 at 32 Years.  Cystoscopy (SNOMED CT 44467672).  Comments:  12/9/2021 10:56 Shakira Germain RN  for kidney stones   Social History        Social & Psychosocial Habits    Alcohol  02/11/2022  Use: Never    Employment/School  02/11/2022  Status: Retired    Highest education: High school    Substance Use  02/08/2022  Use: Never    Tobacco  02/11/2022  Use: Never (less than 100 in l    Patient Wants Consult For Cessation Counseling N/A    Abuse/Neglect  02/11/2022  SHX Any signs of abuse or neglect No    Feels unsafe at home: No    Safe place to go: Yes    Spiritual/Cultural  12/09/2021  Christianity Preference Confucianist    Spiritual Services to Visit? Yes  .        Physical Examination   Vital Signs   2/11/2022 7:05 CST       Oxygen Therapy            Room air    2/11/2022 6:35 CST       Temperature Oral          36.8 DegC                             Temperature Oral (calculated)             98.24 DegF                             Peripheral Pulse Rate     65 bpm                             Respiratory Rate          18 br/min                             SpO2                      97 %                             Oxygen Therapy            Room air                             Systolic Blood Pressure   135 mmHg                             Diastolic Blood Pressure  88 mmHg     Measurements from flowsheet : Measurements   2/11/2022 7:05 CST        Weight Dosing             101.3 kg                             Weight Measured           101.3 kg                             Weight Measured and Calculated in Lbs     223.33 lb                             Height/Length Dosing      167 cm                             Height/Length Measured    167 cm                             Body Mass Index Measured  36.32 kg/m2     General:  Alert and oriented, No acute distress.    Respiratory:  Lungs are clear to auscultation, Respirations are non-labored.    Cardiovascular:  Normal rate, Regular rhythm.    Gastrointestinal:  Soft, Non-tender.    Integumentary:  Warm.    Neurologic:  Alert, Oriented.    Psychiatric:  Cooperative, Appropriate mood & affect.       Impression and Plan   Diagnosis     Cholelithiasis (ZEF06-UR K80.20).     Lap cholecystectomy  Dr. Milian examined patient, discussed recommendations with pt, obtained informed consent, and answered all questions.

## 2022-05-14 NOTE — OP NOTE
Patient:   Janee Costello             MRN: [id number]            FIN: [id number]               Age:   81 years     Sex:  Female     :  1940   Associated Diagnoses:   Cholelithiasis; Calculous cholecystitis   Author:   Sridhar Milian MD      Operative Note   Operative Information   Procedures Performed.     Indications.     Preoperative Diagnosis: Cholelithiasis (VZC01-TG K80.20), Calculous cholecystitis (QJL74-ON K80.10).     Postoperative Diagnosis: Cholelithiasis (FIH83-KH K80.20), Calculous cholecystitis (ZVD10-SM K80.10).     Surgeon: Sridhar Milian MD.     Assistant: Arina Juarez     Anesthesia: Gen..     Speciman Removed: gallbladder.     Description of Procedure/Findings/    Complications:     The patient was taken to the operating room. Patient was placed under general anesthesia. Abdomen was prepped and draped in the usual sterile fashion. An appropriate timeout was performed. Optical tipped trocar was used to access the peritoneal cavity. Pneumoperitoneum was instituted. Abdominal exploration was negative. Gallbladder was noted and held in a cephalad direction. The infundibulum was noted and held in a lateral direction. The peritoneum overlying the infundibulum was dissected revealing the cystic duct gallbladder junction and the cystic artery. The critical view was obtained. The cystic duct and cystic artery were clipped and transected. The gallbladder was removed from the undersurface of the liver with sharp and electric dissection. Hemostasis was assured. The clips were in excellent position and there was no bleeding or leakage of bile. Gallbladder was completely removed from the liver hemostasis was assured. The gallbladder was removed from the abdomen. Once again hemostasis was assured the operative site was irrigated until clear. Trochars were removed and pneumoperitoneum released the incisions were closed with Vicryl..     Esimated blood loss: loss less than  15  cc.      Complications: None.

## 2022-05-21 NOTE — HISTORICAL [facility] CERNER
This is a historical note converted from [employer]. Formatting and pictures may have been removed.  Please reference [employer] for original formatting and attached multimedia. Chief Complaint  s/p ACDF 2/2 cervical spondylosis with cord compression  Reason for Consultation  Physiatry  History of Present Illness  Admit MD: Jones Raya MD  Consult Physiatry: Jeff Martinez MD  HPI: 82yo?WF with PMH of right knee arthritis, atrial fibrillation, CAD, diabetes type 2, hypothyroidism, LBP, Cervicalgia,?and hypertension?presented to [facility] on 12/10 for scheduled surgery 2/2 cervical spondylosis with cord compression.?She underwent an ACDF C4-7 on 12/10 without perioperative complications. ?Removed RADHA drain on 12/13. ?On 12/14 reported more difficulty with swallowing pills. C-Spine XR showed post-op edema. Initiated Decadron IVP x1 per neurosurgery. Participating with therapy. Functional status includes UE/LE dressing requiring moderate assistance. Minimal assistance needed for bed mobility, sit to stand, bed to WC transfer, and Amb w/RW for 125ft.? Patient was evaluated, accepted, and admitted to inpatient rehab to improve functional status. Transferred to [facility] on 12/16 without incident.??  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 Patient is RETIRED- used to work as a  for [employer]. Pt is . No VA Benefits. Patients significant other passed away 15 years ago. She is . Patient lives alone. Children (2). Pt has a son who lives in [place]. Daughter is her main family member and is good support system. Daughter works for [employer].  Medical:Cervical spondylosis with myelopathy,?Atrial fibrillation,?Right knee arthritis,?CAD,?Hypothyroidism,?Obesity,?Hyperlipidemia,?Diabetes type 1,?HTN?  Functional:  Prior Level of Fx: independent ADLs used a rollator for ambulation and a cane at times manages own  meds, and participates in Social Activities with the family, cooks, does chores, does laundry, grocery shops, does work in the yard with her potted plants. does not have medic alert.  Residence:Patient lives alone in [facility] with two steps to enter with a left side rail. Uses a tub shower, rollator, has a cane, shower bench, and grab bars, and handheld shower *Does have elevated toilet*  DME: Patient has the following DME: handicap toilet, cane, shower bench, rollator, grab bars.?  Psychiatric: Hx mental health/substance abuse: Denies mental health history. No history of substance abuse.  Depression/Anxiety:?no complaints????  Pain:?neck??????  gabapentin 300 mg oral capsule)300 mg, form: Cap, Oral, TID  acetaminophen 325 mg oral tablet)650 mg, form: Tab, Oral, q6hr PRN for pain  acetaminophen-hydrocodone 325 mg-10 mg oral tablet)QID PRN for pain  menthol topical, 1 rekha, form: Gel, TOP, TID PRN for pain,? back  methocarbamol, 500 mg, form: Tab, Oral, TID PRN for spasm  Bowels/Bladder: last BM?12/19? ????  Appetite:?good??? ? ?  Sleep:?good??????  ?  Physical Exam  Vitals & Measurements  T:?36.6? ?C (Oral)? TMIN:?36.6? ?C (Oral)? TMAX:?36.9? ?C (Oral)? HR:?60(Peripheral)? BP:?150/70? SpO2:?97%?  General:?well-developed, well-nourished, in no acute distress  Eye: EOMI, clear conjunctiva, eyelids normal  HENT:?normocephalic,??oropharynx and nasal mucosal surfaces moist  Neck: soft cervical collar  Respiratory:?equal chest rise, no SOB, no audible wheeze  Cardiovascular:?regular rate and rhythm, no edema  Gastrointestinal:?soft, non-tender, non-distended?  Musculoskeletal:?BUE (R>L) weakness, BLE weakness  Integumentary: no rashes or skin lesions present, Anterior cervical incision-steristrips, dressing-c/d/i  Neurologic: cranial nerves intact, extremity myelopathy/radiculopathy  ?*MD performed and documented physical examination ??  ?  Assessment/Plan  1.?S/P cervical spinal fusion?Z98.1  2.?Cervical  spinal stenosis?M48.02  3.?Cervical spondylosis with myelopathy?M47.12  4.?Paresthesia (numbness/tingling) of arm?R20.2  5.?Atrial fibrillation?I48.91  6.?CAD - Coronary artery disease?I25.10  7.?Diabetes mellitus?E11.9  8.?Hypertension?I10  9.?Hyperlipidemia?E78.5  10.?Hyperthyroidism?E05.90  11.?Obesity?E66.9  12.?Low back pain?M54.50  13.?Pain in both legs?M79.606  14.?Arthritis of right knee?M17.11  15.?Status post left knee replacement?Z96.652  Orders:  menthol topical, 1 rekha, form: Gel, TOP, TID PRN for pain, first dose 12/20/21 13:08:00 CST, back  methocarbamol, 500 mg, form: Tab, Oral, TID PRN for spasm, first dose 12/20/21 13:07:00 CST  Wounds:?Anterior cervical incision-steristrips, dressing-c/d/i  S/pACDF C4-7 on 12/10 (Bertrand)? ?  Precautions:?spinal?? ? ?  Bracing/AD:?Soft Cervical Collar when OOB; RW????  Swallowing:?Diabetic Diet??  Function: Tolerating therapy. Continue PT/OT/RT  VTE Prophylaxis:?  apixaban 5 mg, form: Tab, Oral, BID  Code Status:?FULL CODE?? ? ?  Discharge:?Patient lives alone in 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 Patient is RETIRED- used to work as a  for [employer]. Pt is . No VA Benefits. Patients significant other passed away 15 years ago. She is . Patient lives alone. Children (2). Pt has a son who lives in [place]. Daughter is her main family member and is good support system. Daughter works for Dr[employer]. Date pending.  dos 12/20/21-  I have?seen and examined patient?in initial Physiatry consult  case & medical records reviewed  I have done and signed? prescreen  Admit?history and PE?completed and reviewed and are consistent with findings on prescreen  I have reviewed case with Юлия Enciso NP  Medical management by Dr Raya and his NPs- I have discussed case with them  PT,OT,ST,RT evaluations ordered and in progress  Med  Reconciliation completed and reviewed in EHR  I?have discussed? expected course with patient  Patient remains appropriate for, in need of, should tolerate and benefit from IRF  Tesfaye Martinez MD  * I was involved in the creation of this note with Юлия Enciso NP  ?  ?  ?  ?  ?  ?  ?   Ophelia Enciso?NP, conducted additional independent physical examination and assisted with medical documentation.?????   Problem List/Past Medical History  Ongoing  Arthritis of right knee  Atrial fibrillation  CAD - Coronary artery disease  Cervical spinal stenosis  Diabetes mellitus  Diabetes type  Hip pain  Hyperlipidemia  Hypertension  Hyperthyroidism  Low back pain  Obesity  Pain in both legs  Paresthesia (numbness/tingling) of arm  Status post left knee replacement  Historical  History of kidney stone  Procedure/Surgical History  Cervical Fusion Anterior (.) (12/10/2021)  Excision of Cervical Vertebral Disc, Open Approach (12/10/2021)  Fusion of 2 or more Cervical Vertebral Joints with Nonautologous Tissue Substitute, Anterior Approach, Anterior Column, Open Approach (12/10/2021)  Colonoscopy (02.2020)  Repair of colovaginal fistula (02.2020)  Knee replacement (06.2016)  Repair of patella (03.2014)  Appendectomy (06.2012)  Placement of stent in cardiac conduit (2010)  Cataract surgery (07.1998)  Hysterectomy (01.1973)  Cystoscopy   Medications  Inpatient  acetaminophen 325 mg oral tablet, 325 mg= 1 tab(s), Oral, q4hr, PRN  acetaminophen 325 mg oral tablet, 650 mg= 2 tab(s), Oral, q6hr, PRN  acetaminophen-hydrocodone 325 mg-10 mg oral tablet, 1 tab(s), Oral, QID, PRN  amLODIPine, 10 mg= 2 tab(s), Oral, Daily  apixaban, 5 mg= 1 tab(s), Oral, BID  Biofreeze 4% topical gel, 1 rekha, TOP, TID, PRN  cloNIDine, 0.2 mg= 2 tab(s), Oral, BID  Colace 100 mg oral capsule, 100 mg= 1 cap(s), Oral, BID  Dextrose 50% and Water (50 mL vial/syringe), 12.5 gm= 25 mL, IV Push, Once, PRN  Dextrose 50% and Water (50 mL vial/syringe), 12.5 gm= 25 mL, IV Push,  As Directed, PRN  Dextrose 50% and Water (50 mL vial/syringe), 25 gm= 50 mL, IV Push, As Directed  Dextrose 50% in Water intravenous solution, 12.5 gm= 25 mL, IV Push, As Directed, PRN  diltiazem 120 mg/24 hours oral CAPsule extended release, 120 mg= 1 cap(s), Oral, Daily  Dulcolax Laxative 10 mg RECTAL suppository, 10 mg= 1 supp, UT (rectal), q3day, PRN  furosemide 40 mg oral tablet, 40 mg= 1 tab(s), Oral, BID  gabapentin 300 mg oral capsule, 300 mg= 1 cap(s), Oral, TID  glucagon, 1 mg= 1 EA, IM, q10min, PRN  glucagon, 1 mg= 1 EA, IM, q10min, PRN  hydrALAZINE, 10 mg= 0.5 mL, IV Push, q4hr, PRN  insulin lispro, 3-21 units, Subcutaneous, As Directed, PRN  labetalol, 10 mg= 2 mL, IV Push, q10min, PRN  lactulose 10 g/15 mL oral syrup, 20 gm= 30 mL, Oral, Every other day, PRN  levothyroxine 50 mcg (0.05 mg) oral tablet, 50 mcg= 1 tab(s), Oral, Daily  meclizine 12.5 mg oral tablet, 12.5 mg= 1 tab(s), Oral, TID, PRN  metoprolol succinate 50 mg oral tablet, extended release, 50 mg= 1 tab(s), Oral, Daily  ondansetron 4 mg oral tablet, disintegrating, 8 mg= 2 tab(s), Oral, q8hr, PRN  Robaxin, 500 mg= 1 tab(s), Oral, TID, PRN  simvastatin 10 mg oral tablet, 10 mg= 1 tab(s), Oral, Daily  Vitamin D3 1000 intl units (25 mcg) oral tablet, 1000 units= 1 tab(s), Oral, Daily  Home  acetaminophen-hydrocodone 325 mg-10 mg oral tablet, 1 tab(s), Oral, QID, PRN  cloniDINE 0.2 mg oral tablet, 0.2 mg= 1 tab(s), Oral, BID  Decara 625 mcg (25,000 intl units) oral capsule, 1250 mcg= 2 cap(s), Oral, qWeek  diltiazem 120 mg/24 hours oral CAPsule extended release, 120 mg= 1 cap(s), Oral, Daily  Eliquis Starter Pack for Treatment of DVT and PE 5 mg oral tablet, 5 mg= 1 tab(s), Oral, BID  furosemide 40 mg oral tablet, 40 mg= 1 tab(s), Oral, BID  gabapentin 300 mg oral capsule, 300 mg= 1 cap(s), Oral, TID  Gvoke HypoPen Two Pack 1 mg/0.2 mL subcutaneous solution, 1 mg, Subcutaneous, Once  levothyroxine 50 mcg (0.05 mg) oral tablet, 50 mcg= 1  tab(s), Oral, Daily  meclizine 12.5 mg oral tablet, 12.5 mg= 1 tab(s), Oral, TID, PRN  metoprolol succinate 50 mg oral tablet, extended release, 75 mg= 1.5 tab(s), Oral, Daily  NovoLOG  pravastatin 20 mg oral tablet, 20 mg= 1 tab(s), Oral, Daily  Vitamin B12, 1 tab(s), Oral  Vitamin D 50,000 intl units oral capsule, 51075 IntUnit= 1 cap(s), Oral, qWeek  Allergies  lisinopril?(Swelling)  sulfa drugs?(Rash)  Social History  Abuse/Neglect  No, No, Yes, 12/16/2021  No, 12/10/2021  No, 12/09/2021  Alcohol  Never, 12/16/2021  Spiritual/Cultural  [place], Yes, 12/09/2021  Substance Use  Tobacco  Never (less than 100 in lifetime), N/A, 12/16/2021  Never (less than 100 in lifetime), No, 12/10/2021  Never (less than 100 in lifetime), N/A, 12/09/2021  Immunizations  Vaccine Date Status   COVID-19 MRNA, LNP-S, PF- Pfizer 01/31/2021 Given   COVID-19 MRNA, LNP-S, PF- Pfizer 01/10/2021 Given   Lab Results  Test Name Test Result Date/Time   Sodium Lvl 143 mmol/L 12/20/2021 05:20 CST   Potassium Lvl 4.7 mmol/L 12/20/2021 05:20 CST   Chloride 104 mmol/L 12/20/2021 05:20 CST   CO2 31 mmol/L 12/20/2021 05:20 CST   Calcium Lvl 9.0 mg/dL 12/20/2021 05:20 CST   Glucose Lvl 81 mg/dL (Low) 12/20/2021 05:20 CST   BUN 26.0 mg/dL (High) 12/20/2021 05:20 CST   Creatinine 1.12 mg/dL (High) 12/20/2021 05:20 CST   Est Creat Clearance Ser 38.34 mL/min 12/20/2021 06:16 CST   eGFR-AA 60 12/20/2021 05:20 CST   eGFR-RUDI 50 mL/min/1.73 m2 12/20/2021 05:20 CST   Bili Total 1.3 mg/dL (High) 12/20/2021 05:20 CST   Bili Direct 0.4 mg/dL 12/20/2021 05:20 CST   Bili Indirect 0.90 mg/dL (High) 12/20/2021 05:20 CST   AST 17 unit/L 12/20/2021 05:20 CST   ALT 16 unit/L 12/20/2021 05:20 CST   Alk Phos 91 unit/L 12/20/2021 05:20 CST   Total Protein 5.4 gm/dL (Low) 12/20/2021 05:20 CST   Albumin Lvl 3.1 gm/dL (Low) 12/20/2021 05:20 CST   Globulin 2.3 gm/dL (Low) 12/20/2021 05:20 CST   A/G Ratio 1.3 ratio 12/20/2021 05:20 CST   Prealbumin 20.8 mg/dL 12/20/2021 05:20  CST   Chol 135 mg/dL 12/20/2021 05:20 CST   HDL 33 mg/dL (Low) 12/20/2021 05:20 CST   Trig 112 mg/dL 12/20/2021 05:20 CST   LDL 80.00 mg/dL 12/20/2021 05:20 CST   Chol/HDL 4 12/20/2021 05:20 CST   VLDL 22 12/20/2021 05:20 CST   WBC 9.6 x10(3)/mcL 12/20/2021 05:20 CST   RBC 4.34 x10(6)/mcL 12/20/2021 05:20 CST   Hgb 12.9 gm/dL 12/20/2021 05:20 CST   Hct 40.8 % 12/20/2021 05:20 CST   Platelet 223 x10(3)/mcL 12/20/2021 05:20 CST   MCV 94.0 fL 12/20/2021 05:20 CST   MCH 29.7 pg 12/20/2021 05:20 CST   MCHC 31.6 gm/dL (Low) 12/20/2021 05:20 CST   RDW 13.1 % 12/20/2021 05:20 CST   MPV 10.1 fL 12/20/2021 05:20 CST   Abs Neut 5.54 x10(3)/mcL 12/20/2021 05:20 CST   Neutro Auto 58.1 % 12/20/2021 05:20 CST   Lymph Auto 27.6 % 12/20/2021 05:20 CST   Mono Auto 9.1 % 12/20/2021 05:20 CST   Eos Auto 4.2 % 12/20/2021 05:20 CST   Abs Eos 0.40 x10(3)/mcL 12/20/2021 05:20 CST   Basophil Auto 0.1 % 12/20/2021 05:20 CST   Abs Neutro 5.54 x10(3)/mcL 12/20/2021 05:20 CST   Abs Lymph 2.64 x10(3)/mcL 12/20/2021 05:20 CST   Abs Mono 0.87 x10(3)/mcL 12/20/2021 05:20 CST   Abs Baso .01 x10(3)/mcL 12/20/2021 05:20 CST   NRBC% 0.0 % 12/20/2021 05:20 CST   IG% 0.900 % (High) 12/20/2021 05:20 CST   IG# 0.0900 (High) 12/20/2021 05:20 CST   Diagnostic Results  (10/22/2021 14:16 CDT MRI Cervical Spine W/O Contrast)  IMPRESSION:?  1. Moderate degenerative canal stenosis from C4 through C7, mild  elsewhere.  2. Central 0.6 cm disc extrusion at C6-7.  3. Varying degrees of neuroforaminal stenoses as detailed, severe  bilaterally at C5-6.  4. No cord signal abnormalities. [1]  ?  (10/22/2021 14:16 CDT MRI Lumbar Spine W/O Contrast)  IMPRESSION:?  1. Severe degenerative canal stenosis from L3 to L5, mild elsewhere.  2. Varying degrees of neuroforaminal stenoses as detailed, severe on  the right at L4-5.  3. No disc herniations.  ? [2]  ?  (10/30/2021 19:27 CDT CT Head W/O Contrast)  IMPRESSION  1. No acute intracranial hemorrhage or convincing large  cortical-based  infarct.  2. Age-related atrophy and chronic sequela of white matter  microvascular disease. [3]  ?  (12/07/2021 14:20 CST XR Chest 2 Views)  Impression  No acute cardiopulmonary process. [4]  ?  (12/14/2021 08:12 CST XR Spine Cervical 2 or 3 Views)  FINDINGS:  There are postoperative changes following anterior cervical discectomy  and fusion at C4-C7. The hardware is intact. Cervical alignment is  maintained. The remaining vertebral body heights and disc spaces are  preserved. There is moderate to severe bilateral facet arthropathy.  There is mild prevertebral soft tissue swelling, likely postoperative.  IMPRESSION:  Postoperative changes without acute abnormality. Mild prevertebral  soft tissue swelling is likely postoperative. [5]     [1]?MRI Cervical Spine W/O Contrast; Rupesh Gannon MD 10/22/2021 14:16 CDT  [2]?MRI Lumbar Spine W/O Contrast; Rupesh Gannon MD 10/22/2021 14:16 CDT  [3]?CT Head W/O Contrast; Germain Villeda MD 10/30/2021 19:27 CDT  [4]?XR Chest 2 Views; Case Cabral MD 12/07/2021 14:20 CST  [5]?XR Spine Cervical 2 or 3 Views; Reva Gould MD 12/14/2021 08:12 CST

## 2022-05-24 DIAGNOSIS — M48.02 SPINAL STENOSIS IN CERVICAL REGION: Primary | ICD-10-CM

## 2022-05-24 NOTE — TELEPHONE ENCOUNTER
Spoke with patient's daughter and got patient scheduled for 6 mnth PO per Brenda's schedule with cervical XR on 06/15.

## 2022-06-21 RX ORDER — PROMETHAZINE HYDROCHLORIDE 25 MG/1
25 TABLET ORAL
COMMUNITY
Start: 2022-05-15

## 2022-06-21 RX ORDER — AMLODIPINE BESYLATE 5 MG/1
10 TABLET ORAL EVERY MORNING
COMMUNITY
Start: 2022-05-24

## 2022-06-21 RX ORDER — FUROSEMIDE 40 MG/1
40 TABLET ORAL 2 TIMES DAILY
Status: ON HOLD | COMMUNITY
Start: 2022-03-22 | End: 2022-07-27 | Stop reason: HOSPADM

## 2022-06-21 RX ORDER — DICYCLOMINE HYDROCHLORIDE 20 MG/1
20 TABLET ORAL 4 TIMES DAILY
COMMUNITY
Start: 2022-05-15

## 2022-06-21 RX ORDER — GABAPENTIN 300 MG/1
CAPSULE ORAL
COMMUNITY
Start: 2022-05-04

## 2022-06-21 RX ORDER — HUMAN INSULIN 100 [IU]/ML
INJECTION, SOLUTION SUBCUTANEOUS
COMMUNITY
Start: 2022-04-30

## 2022-06-21 RX ORDER — LEVOTHYROXINE SODIUM 50 UG/1
TABLET ORAL
COMMUNITY
Start: 2022-05-15

## 2022-06-21 RX ORDER — METHOCARBAMOL 500 MG/1
500 TABLET, FILM COATED ORAL 3 TIMES DAILY PRN
COMMUNITY
Start: 2022-05-15

## 2022-06-21 RX ORDER — CHOLECALCIFEROL (VITAMIN D3) 625 MCG
25000 CAPSULE ORAL WEEKLY
COMMUNITY
Start: 2022-04-28

## 2022-06-21 RX ORDER — ASPIRIN 81 MG/1
81 TABLET ORAL
COMMUNITY

## 2022-06-21 RX ORDER — HYDROCODONE BITARTRATE AND ACETAMINOPHEN 10; 325 MG/1; MG/1
1 TABLET ORAL 4 TIMES DAILY
Status: ON HOLD | COMMUNITY
Start: 2022-06-03 | End: 2022-07-27 | Stop reason: HOSPADM

## 2022-06-21 RX ORDER — APIXABAN 5 MG/1
5 TABLET, FILM COATED ORAL 2 TIMES DAILY
COMMUNITY
Start: 2022-05-27

## 2022-06-23 ENCOUNTER — OFFICE VISIT (OUTPATIENT)
Dept: NEUROSURGERY | Facility: CLINIC | Age: 82
End: 2022-06-23
Payer: COMMERCIAL

## 2022-06-23 VITALS
BODY MASS INDEX: 38.62 KG/M2 | HEART RATE: 53 BPM | HEIGHT: 63 IN | DIASTOLIC BLOOD PRESSURE: 52 MMHG | SYSTOLIC BLOOD PRESSURE: 133 MMHG | WEIGHT: 218 LBS | RESPIRATION RATE: 16 BRPM

## 2022-06-23 DIAGNOSIS — M48.02 SPINAL STENOSIS IN CERVICAL REGION: Primary | ICD-10-CM

## 2022-06-23 DIAGNOSIS — Z98.1 S/P CERVICAL SPINAL FUSION: ICD-10-CM

## 2022-06-23 PROCEDURE — 1159F PR MEDICATION LIST DOCUMENTED IN MEDICAL RECORD: ICD-10-PCS | Mod: CPTII,,, | Performed by: PHYSICIAN ASSISTANT

## 2022-06-23 PROCEDURE — 1101F PT FALLS ASSESS-DOCD LE1/YR: CPT | Mod: CPTII,,, | Performed by: PHYSICIAN ASSISTANT

## 2022-06-23 PROCEDURE — 99214 OFFICE O/P EST MOD 30 MIN: CPT | Mod: ,,, | Performed by: PHYSICIAN ASSISTANT

## 2022-06-23 PROCEDURE — 1125F AMNT PAIN NOTED PAIN PRSNT: CPT | Mod: CPTII,,, | Performed by: PHYSICIAN ASSISTANT

## 2022-06-23 PROCEDURE — 1160F PR REVIEW ALL MEDS BY PRESCRIBER/CLIN PHARMACIST DOCUMENTED: ICD-10-PCS | Mod: CPTII,,, | Performed by: PHYSICIAN ASSISTANT

## 2022-06-23 PROCEDURE — 3078F PR MOST RECENT DIASTOLIC BLOOD PRESSURE < 80 MM HG: ICD-10-PCS | Mod: CPTII,,, | Performed by: PHYSICIAN ASSISTANT

## 2022-06-23 PROCEDURE — 1101F PR PT FALLS ASSESS DOC 0-1 FALLS W/OUT INJ PAST YR: ICD-10-PCS | Mod: CPTII,,, | Performed by: PHYSICIAN ASSISTANT

## 2022-06-23 PROCEDURE — 1160F RVW MEDS BY RX/DR IN RCRD: CPT | Mod: CPTII,,, | Performed by: PHYSICIAN ASSISTANT

## 2022-06-23 PROCEDURE — 1125F PR PAIN SEVERITY QUANTIFIED, PAIN PRESENT: ICD-10-PCS | Mod: CPTII,,, | Performed by: PHYSICIAN ASSISTANT

## 2022-06-23 PROCEDURE — 3078F DIAST BP <80 MM HG: CPT | Mod: CPTII,,, | Performed by: PHYSICIAN ASSISTANT

## 2022-06-23 PROCEDURE — 3075F PR MOST RECENT SYSTOLIC BLOOD PRESS GE 130-139MM HG: ICD-10-PCS | Mod: CPTII,,, | Performed by: PHYSICIAN ASSISTANT

## 2022-06-23 PROCEDURE — 1159F MED LIST DOCD IN RCRD: CPT | Mod: CPTII,,, | Performed by: PHYSICIAN ASSISTANT

## 2022-06-23 PROCEDURE — 3288F FALL RISK ASSESSMENT DOCD: CPT | Mod: CPTII,,, | Performed by: PHYSICIAN ASSISTANT

## 2022-06-23 PROCEDURE — 99214 PR OFFICE/OUTPT VISIT, EST, LEVL IV, 30-39 MIN: ICD-10-PCS | Mod: ,,, | Performed by: PHYSICIAN ASSISTANT

## 2022-06-23 PROCEDURE — 3075F SYST BP GE 130 - 139MM HG: CPT | Mod: CPTII,,, | Performed by: PHYSICIAN ASSISTANT

## 2022-06-23 PROCEDURE — 3288F PR FALLS RISK ASSESSMENT DOCUMENTED: ICD-10-PCS | Mod: CPTII,,, | Performed by: PHYSICIAN ASSISTANT

## 2022-06-23 NOTE — PROGRESS NOTES
[facility] Physical  Neurosurgery      Janee Costello   [id number]   1940       CHIEF COMPLAINT:  Neck pain, right arm weakness, numbness at left shoulder.    HPI:  Janee Costello is a 81 y.o. female who presents for 6 months postop follow up.  On 12/10/2021, the patient underwent C4-5, C5-6, and C6-7 ACDF with Dr. Tongs.  She returns today for follow-up with cervical x-rays.  She complains of neck pain noting that she did not have neck pain prior to surgery.  She states that prior to surgery she had tingling at the left shoulder.  After surgery she had weakness in the right forearm.  This has improved.  Although, she still lacks full range of motion of the right shoulder.  She also complains of numbness at the left shoulder which began after surgery.  In addition, she has pain in the buttocks and lower extremities.  Plan is to have lumbar decompression with Dr. Jason Jorge in 2 weeks.      Past Medical History:   Diagnosis Date    Anticoagulant long-term use     Arthritis     Cold intolerance     patient reports she gets rash if cold therapy used for extended periods    Coronary artery disease 2010    Diabetes mellitus     GERD (gastroesophageal reflux disease)     Hyperlipidemia     Hypertension     Insulin pump in place     Lumbar herniated disc     Thyroid disease        Past Surgical History:   Procedure Laterality Date    APPENDECTOMY      CORONARY STENT PLACEMENT      EYE SURGERY      HYSTERECTOMY      KIDNEY STONE SURGERY      KNEE SURGERY       Family History   Problem Relation Age of Onset    Heart disease Mother     Cancer Brother     Hypertension Daughter     Diabetes Daughter     Hyperlipidemia Daughter        Social History     Socioeconomic History    Marital status:    Tobacco Use    Smoking status: Never Smoker    Smokeless tobacco: Never Used   Substance and Sexual Activity    Alcohol use: No       Current Outpatient Medications    Medication Sig Dispense Refill    amLODIPine (NORVASC) 5 MG tablet Take 10 mg by mouth every morning.      aspirin (ECOTRIN) 81 MG EC tablet Take 81 mg by mouth.      cloNIDine (CATAPRES) 0.2 MG tablet Take 0.2 mg by mouth 2 (two) times daily.      DECARA 625 mcg (25,000 unit) Cap capsule Take 25,000 Units by mouth once a week.      dicyclomine (BENTYL) 20 mg tablet Take 20 mg by mouth 4 (four) times daily.      diltiazem (CARDIZEM CD) 120 MG Cp24 Take 180 mg by mouth once daily.      ELIQUIS 5 mg Tab Take 5 mg by mouth 2 (two) times daily.      furosemide (LASIX) 40 MG tablet Take 40 mg by mouth 2 (two) times daily.      gabapentin (NEURONTIN) 300 MG capsule TAKE ONE CAPSULE BY MOUTH TWICE DAILY to three times daily      HYDROcodone-acetaminophen (NORCO)  mg per tablet Take 1 tablet by mouth 4 (four) times daily.      insulin pump syringe 3 mL Misc by Other route.      methocarbamoL (ROBAXIN) 500 MG Tab Take 500 mg by mouth 3 (three) times daily as needed.      metoprolol succinate (TOPROL-XL) 50 MG 24 hr tablet Take 50 mg by mouth once daily. Takes 1  1/2 tablets daily      NOVOLIN R REGULAR U-100 INSULN 100 unit/mL injection USE UP  UNITS SUBCUTANEOUSLY VIA INSULIN PUMP DAILY FOR 90 DAYS      pravastatin (PRAVACHOL) 20 MG tablet Take 20 mg by mouth once daily.      promethazine (PHENERGAN) 25 MG tablet Take 25 mg by mouth.      SYNTHROID 50 mcg tablet Take by mouth.      insulin aspart U-100 (NOVOLOG) 100 unit/mL injection Inject into the skin. INSULIN PUMP      oxycodone-acetaminophen (PERCOCET) 5-325 mg per tablet 1 tab every 4 hours PRN pain or 2 tablets every 6 hours PRN pain (Patient not taking: Reported on 6/23/2022) 90 tablet 0    pantoprazole (PROTONIX) 40 MG tablet Take 40 mg by mouth once daily.      rivaroxaban (XARELTO) 10 mg Tab Take 1 tablet (10 mg total) by mouth daily with dinner or evening meal. TAKE FOR 2 WEEKS THEN RESUME PLAVIX AND BABY ASPIRIN 14 tablet 0  "    No current facility-administered medications for this visit.       Review of patient's allergies indicates:   Allergen Reactions    Lisinopril Swelling    Sulfa (sulfonamide antibiotics) Other (See Comments) and Rash     Facial redness  Skin turned bright red          Review of Systems   Constitutional: Negative for chills and fever.   HENT: Negative for nosebleeds and sore throat.    Eyes: Negative for pain and visual disturbance.   Respiratory: Negative for cough, chest tightness and shortness of breath.    Cardiovascular: Negative for chest pain.   Gastrointestinal: Negative for diarrhea, nausea and vomiting.   Genitourinary: Negative for difficulty urinating, dysuria and hematuria.   Musculoskeletal: Positive for back pain, neck pain and neck stiffness. Negative for gait problem and myalgias.   Skin: Negative for rash.   Neurological: Positive for weakness and numbness. Negative for dizziness, facial asymmetry and headaches.   Psychiatric/Behavioral: Negative for confusion and sleep disturbance. The patient is not nervous/anxious.        Physical Examination:    BP (!) 133/52 (BP Location: Other (Comment), Patient Position: Sitting)   Pulse (!) 53   Resp 16   Ht 5' 3" (1.6 m)   Wt 98.9 kg (218 lb)   BMI 38.62 kg/m²     General:  Pleasant. No acute distress. Overweight.    Lungs:  Breathing is quiet, non-labored     Musculoskeletal:  Cervical ROM: Moderately limited with extension and bilateral rotation.   Right active shoulder flexion is 100°.    Neurological:    Oriented to Person, Place, Time    Her memory cognition and affect are appropriate.  Speech is fluent.  Gait is antalgic.  She walks slumped over, using a cane.      Imaging:  Cervical x-rays were obtained on 06/14/2022.  This study shows good, stable position of the hardware at C4-5 C5-6 and C6-7.    ASSESSMENT/PLAN:     1. Spinal stenosis in cervical region    2. S/P cervical spinal fusion  - X-Ray Cervical Spine 5 View W Flex Extxt; " Future       The patient and her daughter had many questions surrounding the events prior to surgery, throughout hospital course, and recovery thereafter.  I reviewed the patient's preoperative MRI and x-rays with both her and her daughter.  We also went over the postoperative MRI.  They are disappointed about the persistent weakness in her right upper extremity.  She is right-hand dominant.  She is involved in a home exercise program.  I have encouraged her to continue with this as there is still room for improvement.  All of their questions were answered to the best of my ability.  She will return for follow-up at 1 year postop.        A total of 32 minutes was spent face-to-face with the patient during this encounter.  Over half of that time was spent on counseling and coordination of care.  Additional time was used to reviewed the patient's chart and cervical x-rays and MRIs.

## 2022-07-14 PROBLEM — Z98.890 S/P LAMINECTOMY: Status: ACTIVE | Noted: 2022-07-14

## 2022-12-09 ENCOUNTER — LAB VISIT (OUTPATIENT)
Dept: LAB | Facility: HOSPITAL | Age: 82
End: 2022-12-09
Attending: NURSE PRACTITIONER
Payer: COMMERCIAL

## 2022-12-09 DIAGNOSIS — R11.0 NAUSEA: ICD-10-CM

## 2022-12-09 DIAGNOSIS — K21.9 GASTROESOPHAGEAL REFLUX DISEASE, UNSPECIFIED WHETHER ESOPHAGITIS PRESENT: ICD-10-CM

## 2022-12-09 DIAGNOSIS — E11.9 DIABETES MELLITUS WITHOUT COMPLICATION: ICD-10-CM

## 2022-12-09 DIAGNOSIS — K57.90 DIVERTICULOSIS: ICD-10-CM

## 2022-12-09 DIAGNOSIS — R13.14 DYSPHAGIA, PHARYNGOESOPHAGEAL PHASE: ICD-10-CM

## 2022-12-09 DIAGNOSIS — E04.1 NONTOXIC UNINODULAR GOITER: ICD-10-CM

## 2022-12-09 DIAGNOSIS — Z79.899 ENCOUNTER FOR LONG-TERM (CURRENT) USE OF OTHER MEDICATIONS: Primary | ICD-10-CM

## 2022-12-09 DIAGNOSIS — K76.0 FATTY METAMORPHOSIS OF LIVER: ICD-10-CM

## 2022-12-09 LAB
ALBUMIN SERPL-MCNC: 3.8 GM/DL (ref 3.4–4.8)
ALBUMIN/GLOB SERPL: 1.6 RATIO (ref 1.1–2)
ALP SERPL-CCNC: 117 UNIT/L (ref 40–150)
ALT SERPL-CCNC: 17 UNIT/L (ref 0–55)
AST SERPL-CCNC: 14 UNIT/L (ref 5–34)
BASOPHILS # BLD AUTO: 0.02 X10(3)/MCL (ref 0–0.2)
BASOPHILS NFR BLD AUTO: 0.4 %
BILIRUBIN DIRECT+TOT PNL SERPL-MCNC: 0.8 MG/DL
BUN SERPL-MCNC: 17.8 MG/DL (ref 9.8–20.1)
CALCIUM SERPL-MCNC: 9 MG/DL (ref 8.4–10.2)
CHLORIDE SERPL-SCNC: 105 MMOL/L (ref 98–107)
CHOLEST SERPL-MCNC: 149 MG/DL
CHOLEST/HDLC SERPL: 5 {RATIO} (ref 0–5)
CO2 SERPL-SCNC: 29 MMOL/L (ref 23–31)
CREAT SERPL-MCNC: 1.03 MG/DL (ref 0.55–1.02)
CRP SERPL HS-MCNC: 1.52 MG/L
DEPRECATED CALCIDIOL+CALCIFEROL SERPL-MC: 42.7 NG/ML (ref 30–80)
EOSINOPHIL # BLD AUTO: 0.33 X10(3)/MCL (ref 0–0.9)
EOSINOPHIL NFR BLD AUTO: 6.5 %
ERYTHROCYTE [DISTWIDTH] IN BLOOD BY AUTOMATED COUNT: 12.9 % (ref 11.5–17)
ERYTHROCYTE [SEDIMENTATION RATE] IN BLOOD: 1 MM/HR (ref 0–20)
EST. AVERAGE GLUCOSE BLD GHB EST-MCNC: 119.8 MG/DL
GFR SERPLBLD CREATININE-BSD FMLA CKD-EPI: 55 MLS/MIN/1.73/M2
GGT SERPL-CCNC: 16 U/L (ref 9–36)
GLOBULIN SER-MCNC: 2.4 GM/DL (ref 2.4–3.5)
GLUCOSE SERPL-MCNC: 123 MG/DL (ref 82–115)
HBA1C MFR BLD: 5.8 %
HCT VFR BLD AUTO: 38.3 % (ref 37–47)
HDLC SERPL-MCNC: 32 MG/DL (ref 35–60)
HGB BLD-MCNC: 12.3 GM/DL (ref 12–16)
IMM GRANULOCYTES # BLD AUTO: 0.01 X10(3)/MCL (ref 0–0.04)
IMM GRANULOCYTES NFR BLD AUTO: 0.2 %
LDLC SERPL CALC-MCNC: 64 MG/DL (ref 50–140)
LYMPHOCYTES # BLD AUTO: 1.81 X10(3)/MCL (ref 0.6–4.6)
LYMPHOCYTES NFR BLD AUTO: 35.5 %
MCH RBC QN AUTO: 29.9 PG (ref 27–31)
MCHC RBC AUTO-ENTMCNC: 32.1 MG/DL (ref 33–36)
MCV RBC AUTO: 93.2 FL (ref 80–94)
MONOCYTES # BLD AUTO: 0.49 X10(3)/MCL (ref 0.1–1.3)
MONOCYTES NFR BLD AUTO: 9.6 %
NEUTROPHILS # BLD AUTO: 2.4 X10(3)/MCL (ref 2.1–9.2)
NEUTROPHILS NFR BLD AUTO: 47.8 %
NRBC BLD AUTO-RTO: 0 %
PLATELET # BLD AUTO: 164 X10(3)/MCL (ref 130–400)
PMV BLD AUTO: 9.8 FL (ref 7.4–10.4)
POTASSIUM SERPL-SCNC: 4.1 MMOL/L (ref 3.5–5.1)
PROT SERPL-MCNC: 6.2 GM/DL (ref 5.8–7.6)
RBC # BLD AUTO: 4.11 X10(6)/MCL (ref 4.2–5.4)
SODIUM SERPL-SCNC: 142 MMOL/L (ref 136–145)
TRIGL SERPL-MCNC: 265 MG/DL (ref 37–140)
VLDLC SERPL CALC-MCNC: 53 MG/DL
WBC # SPEC AUTO: 5.1 X10(3)/MCL (ref 4.5–11.5)

## 2022-12-09 PROCEDURE — 80053 COMPREHEN METABOLIC PANEL: CPT

## 2022-12-09 PROCEDURE — 82977 ASSAY OF GGT: CPT

## 2022-12-09 PROCEDURE — 80061 LIPID PANEL: CPT

## 2022-12-09 PROCEDURE — 36415 COLL VENOUS BLD VENIPUNCTURE: CPT

## 2022-12-09 PROCEDURE — 86141 C-REACTIVE PROTEIN HS: CPT

## 2022-12-09 PROCEDURE — 83036 HEMOGLOBIN GLYCOSYLATED A1C: CPT

## 2022-12-09 PROCEDURE — 85651 RBC SED RATE NONAUTOMATED: CPT

## 2022-12-09 PROCEDURE — 82306 VITAMIN D 25 HYDROXY: CPT

## 2022-12-09 PROCEDURE — 85025 COMPLETE CBC W/AUTO DIFF WBC: CPT

## 2022-12-15 ENCOUNTER — HOSPITAL ENCOUNTER (OUTPATIENT)
Dept: RADIOLOGY | Facility: HOSPITAL | Age: 82
Discharge: HOME | End: 2022-12-15
Attending: PHYSICIAN ASSISTANT
Payer: COMMERCIAL

## 2022-12-15 ENCOUNTER — OFFICE VISIT (OUTPATIENT)
Dept: NEUROSURGERY | Facility: CLINIC | Age: 82
End: 2022-12-15
Payer: COMMERCIAL

## 2022-12-15 DIAGNOSIS — M48.02 SPINAL STENOSIS IN CERVICAL REGION: Primary | ICD-10-CM

## 2022-12-15 DIAGNOSIS — Z98.1 S/P CERVICAL SPINAL FUSION: ICD-10-CM

## 2022-12-15 PROCEDURE — 1160F RVW MEDS BY RX/DR IN RCRD: CPT | Mod: CPTII,,, | Performed by: PHYSICIAN ASSISTANT

## 2022-12-15 PROCEDURE — 1160F PR REVIEW ALL MEDS BY PRESCRIBER/CLIN PHARMACIST DOCUMENTED: ICD-10-PCS | Mod: CPTII,,, | Performed by: PHYSICIAN ASSISTANT

## 2022-12-15 PROCEDURE — 99024 POSTOP FOLLOW-UP VISIT: CPT | Mod: ,,, | Performed by: PHYSICIAN ASSISTANT

## 2022-12-15 PROCEDURE — 1101F PT FALLS ASSESS-DOCD LE1/YR: CPT | Mod: CPTII,,, | Performed by: PHYSICIAN ASSISTANT

## 2022-12-15 PROCEDURE — 72052 X-RAY EXAM NECK SPINE 6/>VWS: CPT | Mod: TC

## 2022-12-15 PROCEDURE — 3288F FALL RISK ASSESSMENT DOCD: CPT | Mod: CPTII,,, | Performed by: PHYSICIAN ASSISTANT

## 2022-12-15 PROCEDURE — 99024 PR POST-OP FOLLOW-UP VISIT: ICD-10-PCS | Mod: ,,, | Performed by: PHYSICIAN ASSISTANT

## 2022-12-15 PROCEDURE — 1159F PR MEDICATION LIST DOCUMENTED IN MEDICAL RECORD: ICD-10-PCS | Mod: CPTII,,, | Performed by: PHYSICIAN ASSISTANT

## 2022-12-15 PROCEDURE — 1101F PR PT FALLS ASSESS DOC 0-1 FALLS W/OUT INJ PAST YR: ICD-10-PCS | Mod: CPTII,,, | Performed by: PHYSICIAN ASSISTANT

## 2022-12-15 PROCEDURE — 1159F MED LIST DOCD IN RCRD: CPT | Mod: CPTII,,, | Performed by: PHYSICIAN ASSISTANT

## 2022-12-15 PROCEDURE — 3288F PR FALLS RISK ASSESSMENT DOCUMENTED: ICD-10-PCS | Mod: CPTII,,, | Performed by: PHYSICIAN ASSISTANT

## 2022-12-15 RX ORDER — MECLIZINE HYDROCHLORIDE 25 MG/1
25 TABLET ORAL DAILY PRN
COMMUNITY

## 2022-12-15 RX ORDER — CARVEDILOL 6.25 MG/1
6.25 TABLET ORAL DAILY
COMMUNITY

## 2022-12-16 ENCOUNTER — OFFICE VISIT (OUTPATIENT)
Dept: NEUROSURGERY | Facility: CLINIC | Age: 82
End: 2022-12-16
Payer: COMMERCIAL

## 2022-12-16 DIAGNOSIS — Z98.1 S/P CERVICAL SPINAL FUSION: ICD-10-CM

## 2022-12-16 DIAGNOSIS — M47.22 CERVICAL SPONDYLOSIS WITH RADICULOPATHY: Primary | ICD-10-CM

## 2022-12-16 PROCEDURE — 1159F PR MEDICATION LIST DOCUMENTED IN MEDICAL RECORD: ICD-10-PCS | Mod: CPTII,95,, | Performed by: PHYSICIAN ASSISTANT

## 2022-12-16 PROCEDURE — 1160F RVW MEDS BY RX/DR IN RCRD: CPT | Mod: CPTII,95,, | Performed by: PHYSICIAN ASSISTANT

## 2022-12-16 PROCEDURE — 99212 OFFICE O/P EST SF 10 MIN: CPT | Mod: 95,,, | Performed by: PHYSICIAN ASSISTANT

## 2022-12-16 PROCEDURE — 99212 PR OFFICE/OUTPT VISIT, EST, LEVL II, 10-19 MIN: ICD-10-PCS | Mod: 95,,, | Performed by: PHYSICIAN ASSISTANT

## 2022-12-16 PROCEDURE — 1160F PR REVIEW ALL MEDS BY PRESCRIBER/CLIN PHARMACIST DOCUMENTED: ICD-10-PCS | Mod: CPTII,95,, | Performed by: PHYSICIAN ASSISTANT

## 2022-12-16 PROCEDURE — 1159F MED LIST DOCD IN RCRD: CPT | Mod: CPTII,95,, | Performed by: PHYSICIAN ASSISTANT

## 2022-12-16 NOTE — PROGRESS NOTES
Established Patient - Audio Only Telehealth Visit     The patient location is:  [facility]  The chief complaint leading to consultation is:  1 year postoperative follow-up.  Visit type: Virtual visit with audio only (telephone)  Total time spent with patient:  8 minutes       The reason for the audio only service rather than synchronous audio and video virtual visit was related to technical difficulties or patient preference/necessity.     Each patient to whom I provide medical services by telemedicine is:  (1) informed of the relationship between the physician and patient and the respective role of any other health care provider with respect to management of the patient; and (2) notified that they may decline to receive medical services by telemedicine and may withdraw from such care at any time. Patient verbally consented to receive this service via voice-only telephone call.       HPI:  On 12/10/2021, the patient underwent C4-5, C5-6, and C6-7 ACDF with Dr. Jam Thurston.  She complains of pain and discomfort along the trapezius muscles in the base of her neck.  Postoperatively, she had weakness in the right arm.  She says now that she is able to raise the right arm above her head.  She continues with tingling in the hands.  Subjectively, she feels as though she has weakness in both hands.  She complains of restricted range of motion.  However, she is able to function relatively well.  She notes she is no longer driving.  Her symptoms are at a plateau.    Cervical x-rays were obtained on 12/15/2022.  This study shows stable position of the hardware.  She is solid at C6-7.  There is pseudoarthrosis at C4-5 and C5-6.  There is no abnormal motion with flexion or extension.       Assessment and plan:    Cervical spondylosis with radiculopathy  Status post ACDF    The patient is tolerating her activity.  She is more concerned about lower back and leg pain.  He underwent lumbar decompression with Dr. Jorge  recently.  She has seen a resolution of the sciatic type pain.  He has continued with some numbness.  For now, she will return to our office on an as-needed basis.            This service was not originating from a related E/M service provided within the previous 7 days nor will  to an E/M service or procedure within the next 24 hours or my soonest available appointment.  Prevailing standard of care was able to be met in this audio-only visit.

## 2023-01-23 ENCOUNTER — PATIENT MESSAGE (OUTPATIENT)
Dept: NEUROSURGERY | Facility: CLINIC | Age: 83
End: 2023-01-23
Payer: COMMERCIAL

## 2023-01-23 DIAGNOSIS — M47.22 CERVICAL SPONDYLOSIS WITH RADICULOPATHY: Primary | ICD-10-CM

## 2024-04-19 ENCOUNTER — ON-DEMAND VIRTUAL (OUTPATIENT)
Dept: URGENT CARE | Facility: CLINIC | Age: 84
End: 2024-04-19
Payer: COMMERCIAL

## 2024-04-19 DIAGNOSIS — L08.9 FINGER INFECTION: Primary | ICD-10-CM

## 2024-04-19 PROCEDURE — 99203 OFFICE O/P NEW LOW 30 MIN: CPT | Mod: 95,,, | Performed by: NURSE PRACTITIONER

## 2024-04-19 RX ORDER — DOXYCYCLINE 100 MG/1
100 CAPSULE ORAL 2 TIMES DAILY
Qty: 20 CAPSULE | Refills: 0 | Status: SHIPPED | OUTPATIENT
Start: 2024-04-19 | End: 2024-04-29

## 2024-04-19 NOTE — PROGRESS NOTES
Subjective:      Patient ID: Janee Costello is a 83 y.o. female.    Vitals:  vitals were not taken for this visit.     Chief Complaint: Hand Pain      Visit Type: TELE AUDIOVISUAL    Present with the patient at the time of consultation: TELEMED PRESENT WITH PATIENT: None        Past Medical History:   Diagnosis Date    A-fib     Anticoagulant long-term use     Arthritis     Cold intolerance     patient reports she gets rash if cold therapy used for extended periods    Coronary artery disease 2010    Diabetes mellitus     GERD (gastroesophageal reflux disease)     Hyperlipidemia     Hypertension     Insulin pump in place     Lumbar herniated disc     Thyroid disease      Past Surgical History:   Procedure Laterality Date    APPENDECTOMY      CORONARY STENT PLACEMENT      EYE SURGERY      HYSTERECTOMY      KIDNEY STONE SURGERY      KNEE SURGERY      LUMBAR LAMINECTOMY  07/12/2022    Dr. Jason Jorge    NECK SURGERY       Review of patient's allergies indicates:   Allergen Reactions    Lisinopril Swelling    Sulfa (sulfonamide antibiotics) Other (See Comments) and Rash     Facial redness  Skin turned bright red       Current Outpatient Medications on File Prior to Visit   Medication Sig Dispense Refill    amLODIPine (NORVASC) 5 MG tablet Take 10 mg by mouth every morning.      aspirin (ECOTRIN) 81 MG EC tablet Take 81 mg by mouth.      carvediloL (COREG) 6.25 MG tablet Take 6.25 mg by mouth once daily.      cloNIDine (CATAPRES) 0.2 MG tablet Take 0.2 mg by mouth 2 (two) times daily. 1 in the morning and 1/2 a bedtime      DECARA 625 mcg (25,000 unit) Cap capsule Take 25,000 Units by mouth once a week.      dicyclomine (BENTYL) 20 mg tablet Take 20 mg by mouth 4 (four) times daily.      diltiazem (CARDIZEM CD) 120 MG Cp24 Take 180 mg by mouth once daily.      ELIQUIS 5 mg Tab Take 5 mg by mouth 2 (two) times daily.      furosemide (LASIX) 40 MG tablet Take 1 tablet (40 mg total) by mouth once daily. (Patient taking  differently: Take 40 mg by mouth 2 (two) times a day.) 30 tablet 0    gabapentin (NEURONTIN) 300 MG capsule TAKE ONE CAPSULE BY MOUTH TWICE DAILY to three times daily      HYDROcodone-acetaminophen (NORCO)  mg per tablet Take 1 tablet by mouth every 6 (six) hours as needed for Pain. (Patient taking differently: Take 1 tablet by mouth 3 (three) times daily.) 28 tablet 0    insulin aspart U-100 (NOVOLOG) 100 unit/mL injection Inject into the skin. INSULIN PUMP      insulin pump syringe 3 mL Misc by Other route.      meclizine (ANTIVERT) 25 mg tablet Take 25 mg by mouth daily as needed.      methocarbamoL (ROBAXIN) 500 MG Tab Take 500 mg by mouth 3 (three) times daily as needed.      NOVOLIN R REGULAR U-100 INSULN 100 unit/mL injection USE UP  UNITS SUBCUTANEOUSLY VIA INSULIN PUMP DAILY FOR 90 DAYS      pantoprazole (PROTONIX) 40 MG tablet Take 40 mg by mouth once daily.      pravastatin (PRAVACHOL) 20 MG tablet Take 20 mg by mouth once daily.      promethazine (PHENERGAN) 25 MG tablet Take 25 mg by mouth.      SYNTHROID 50 mcg tablet Take by mouth.      [DISCONTINUED] rivaroxaban (XARELTO) 10 mg Tab Take 1 tablet (10 mg total) by mouth daily with dinner or evening meal. TAKE FOR 2 WEEKS THEN RESUME PLAVIX AND BABY ASPIRIN 14 tablet 0     No current facility-administered medications on file prior to visit.     Family History   Problem Relation Name Age of Onset    Heart disease Mother      Cancer Brother      Hypertension Daughter      Diabetes Daughter      Hyperlipidemia Daughter         Medications Ordered                [id number] - [place] - [address]   [address] [address]    Telephone: [phone]   Fax: [phone]   Hours: Not open 24 hours                         E-Prescribed (1 of 1)              doxycycline (VIBRAMYCIN) 100 MG Cap    Sig: Take 1 capsule (100 mg total) by mouth 2 (two) times daily. for 10 days       Start: 4/19/24      Quantity: 20 capsule Refills: 0                           Ohs Peq Odvv Intake    4/19/2024 12:29 PM CDT - Filed by Patient   What is your current physical address in the event of a medical emergency? [address], [place] [address]   Are you able to take your vital signs? Yes   Systolic Blood Pressure: 128   Diastolic Blood Pressure: 78   Weight: 203   Height: 66   Pulse: 82   Temperature:    Respiration rate: 16   Pulse Oxygen:    Please attach any relevant images or files          84 yo female with c/o infection to Third finger at the PIP. Her daughter is a RN and did try to drain the pus. Per daughter she did get a little drainage. She states she was in garden and must have pricked on something and it got infected. They have also used neosporin. Pmh includes diabetes, anxiety, CAD, htn        Constitution: Negative.   HENT: Negative.     Cardiovascular: Negative.    Respiratory: Negative.     Gastrointestinal: Negative.    Endocrine: negative.   Genitourinary: Negative.  Negative for frequency and urgency.   Musculoskeletal: Negative.    Skin:  Positive for wound and abscess.   Allergic/Immunologic: Negative.    Neurological: Negative.    Hematologic/Lymphatic: Negative.    Psychiatric/Behavioral: Negative.          Objective:   The physical exam was conducted virtually.  LOCATION OF PATIENT home  Physical Exam   Constitutional: She is oriented to person, place, and time. She appears well-developed.   HENT:   Head: Normocephalic and atraumatic.   Ears:   Right Ear: Hearing, tympanic membrane and external ear normal.   Left Ear: Hearing, tympanic membrane and external ear normal.   Nose: Nose normal.   Mouth/Throat: Uvula is midline, oropharynx is clear and moist and mucous membranes are normal.   Eyes: Conjunctivae and EOM are normal. Pupils are equal, round, and reactive to light.   Neck: Neck supple.   Cardiovascular: Normal rate.   Pulmonary/Chest: Effort normal and breath sounds  normal.   Musculoskeletal: Normal range of motion.         General: Normal range of motion.   Neurological: She is alert and oriented to person, place, and time.   Skin: Skin is warm and abscessed.        Psychiatric: Her behavior is normal. Thought content normal.   Nursing note and vitals reviewed.      Assessment:     1. Finger infection        Plan:   Warm soaks  Doxycycline 100 mg by mouth twice daily  Follow up with your primary care provider if symptoms persist.  Go to the Emergency room for worsening of symptoms.       Finger infection  -     doxycycline (VIBRAMYCIN) 100 MG Cap; Take 1 capsule (100 mg total) by mouth 2 (two) times daily. for 10 days  Dispense: 20 capsule; Refill: 0

## 2024-05-22 DIAGNOSIS — K21.9 ESOPHAGEAL REFLUX: ICD-10-CM

## 2024-05-22 DIAGNOSIS — R13.14 DYSPHAGIA, PHARYNGOESOPHAGEAL PHASE: Primary | ICD-10-CM

## 2024-07-24 ENCOUNTER — HOSPITAL ENCOUNTER (OUTPATIENT)
Dept: RADIOLOGY | Facility: HOSPITAL | Age: 84
Discharge: HOME | End: 2024-07-24
Attending: NURSE PRACTITIONER
Payer: COMMERCIAL

## 2024-07-24 DIAGNOSIS — K21.9 ESOPHAGEAL REFLUX: ICD-10-CM

## 2024-07-24 DIAGNOSIS — R13.14 DYSPHAGIA, PHARYNGOESOPHAGEAL PHASE: ICD-10-CM

## 2024-07-24 PROCEDURE — 25500020 PHARM REV CODE 255: Performed by: NURSE PRACTITIONER

## 2024-07-24 PROCEDURE — 74220 X-RAY XM ESOPHAGUS 1CNTRST: CPT | Mod: TC

## 2024-07-24 PROCEDURE — A9698 NON-RAD CONTRAST MATERIALNOC: HCPCS | Performed by: NURSE PRACTITIONER

## 2024-07-24 RX ADMIN — BARIUM SULFATE 100 ML: 0.6 SUSPENSION ORAL at 01:07

## 2024-07-24 RX ADMIN — BARIUM SULFATE 100 ML: 980 POWDER, FOR SUSPENSION ORAL at 01:07

## 2025-01-05 ENCOUNTER — ON-DEMAND VIRTUAL (OUTPATIENT)
Dept: URGENT CARE | Facility: CLINIC | Age: 85
End: 2025-01-05
Payer: COMMERCIAL

## 2025-01-05 DIAGNOSIS — L02.91 ABSCESS: Primary | ICD-10-CM

## 2025-01-05 RX ORDER — DOXYCYCLINE 100 MG/1
100 CAPSULE ORAL 2 TIMES DAILY
Qty: 20 CAPSULE | Refills: 0 | Status: SHIPPED | OUTPATIENT
Start: 2025-01-05 | End: 2025-01-15

## 2025-01-05 NOTE — PROGRESS NOTES
Subjective:      Patient ID: Janee Costello is a 84 y.o. female.    Vitals:  vitals were not taken for this visit.     Chief Complaint: Abscess      Visit Type: TELE AUDIOVISUAL - This visit was conducted virtually based on  subjective information and limited objective exam    Present with the patient at the time of consultation: TELEMED PRESENT WITH PATIENT: family member  LOCATION OF PATIENT [place]  Two patient identifiers used to verify patient- saying out date of birth and full name.       Past Medical History:   Diagnosis Date    A-fib     Anticoagulant long-term use     Arthritis     Cold intolerance     patient reports she gets rash if cold therapy used for extended periods    Coronary artery disease 2010    Diabetes mellitus     GERD (gastroesophageal reflux disease)     Hyperlipidemia     Hypertension     Insulin pump in place     Lumbar herniated disc     Thyroid disease      Past Surgical History:   Procedure Laterality Date    APPENDECTOMY      CORONARY STENT PLACEMENT      EYE SURGERY      HYSTERECTOMY      KIDNEY STONE SURGERY      KNEE SURGERY      LUMBAR LAMINECTOMY  07/12/2022    Dr. Jason Jorge    NECK SURGERY       Review of patient's allergies indicates:   Allergen Reactions    Lisinopril Swelling    Sulfa (sulfonamide antibiotics) Other (See Comments) and Rash     Facial redness  Skin turned bright red       Current Outpatient Medications on File Prior to Visit   Medication Sig Dispense Refill    amLODIPine (NORVASC) 5 MG tablet Take 10 mg by mouth every morning.      aspirin (ECOTRIN) 81 MG EC tablet Take 81 mg by mouth.      carvediloL (COREG) 6.25 MG tablet Take 6.25 mg by mouth once daily.      cloNIDine (CATAPRES) 0.2 MG tablet Take 0.2 mg by mouth 2 (two) times daily. 1 in the morning and 1/2 a bedtime      DECARA 625 mcg (25,000 unit) Cap capsule Take 25,000 Units by mouth once a week.      dicyclomine (BENTYL) 20 mg tablet Take 20 mg by mouth 4 (four) times daily.       diltiazem (CARDIZEM CD) 120 MG Cp24 Take 180 mg by mouth once daily.      ELIQUIS 5 mg Tab Take 5 mg by mouth 2 (two) times daily.      furosemide (LASIX) 40 MG tablet Take 1 tablet (40 mg total) by mouth once daily. (Patient taking differently: Take 40 mg by mouth 2 (two) times a day.) 30 tablet 0    gabapentin (NEURONTIN) 300 MG capsule TAKE ONE CAPSULE BY MOUTH TWICE DAILY to three times daily      HYDROcodone-acetaminophen (NORCO)  mg per tablet Take 1 tablet by mouth every 6 (six) hours as needed for Pain. (Patient taking differently: Take 1 tablet by mouth 3 (three) times daily.) 28 tablet 0    insulin aspart U-100 (NOVOLOG) 100 unit/mL injection Inject into the skin. INSULIN PUMP      insulin pump syringe 3 mL Misc by Other route.      meclizine (ANTIVERT) 25 mg tablet Take 25 mg by mouth daily as needed.      methocarbamoL (ROBAXIN) 500 MG Tab Take 500 mg by mouth 3 (three) times daily as needed.      NOVOLIN R REGULAR U-100 INSULN 100 unit/mL injection USE UP  UNITS SUBCUTANEOUSLY VIA INSULIN PUMP DAILY FOR 90 DAYS      pantoprazole (PROTONIX) 40 MG tablet Take 40 mg by mouth once daily.      pravastatin (PRAVACHOL) 20 MG tablet Take 20 mg by mouth once daily.      promethazine (PHENERGAN) 25 MG tablet Take 25 mg by mouth.      SYNTHROID 50 mcg tablet Take by mouth.      [DISCONTINUED] rivaroxaban (XARELTO) 10 mg Tab Take 1 tablet (10 mg total) by mouth daily with dinner or evening meal. TAKE FOR 2 WEEKS THEN RESUME PLAVIX AND BABY ASPIRIN 14 tablet 0     No current facility-administered medications on file prior to visit.     Family History   Problem Relation Name Age of Onset    Heart disease Mother      Cancer Brother      Hypertension Daughter      Diabetes Daughter      Hyperlipidemia Daughter         Medications Ordered                [id number] - [place] - [address]   [address] [address]    Telephone: [phone]   Fax:  [phone]   Hours: Not open 24 hours                         E-Prescribed (1 of 1)              doxycycline (VIBRAMYCIN) 100 MG Cap    Sig: Take 1 capsule (100 mg total) by mouth 2 (two) times daily. for 10 days       Start: 1/5/25     Quantity: 20 capsule Refills: 0                           Ohs Peq Odvv Intake    1/5/2025 12:41 PM CST - Filed by Patient   What is your current physical address in the event of a medical emergency?    Are you able to take your vital signs? No   Please attach any relevant images or files    Is your employer contracted with [facility]? No         83 yo female with daughter with c/o vaginal lesion. She states started out as what possible hair infection. She states but now worsening. Daughter states some bloody discharge from pustule. No fever. She states left lower labial area.         Constitution: Negative.   HENT: Negative.     Cardiovascular: Negative.    Respiratory: Negative.     Gastrointestinal: Negative.    Endocrine: negative.   Genitourinary: Negative.  Negative for frequency and urgency.   Musculoskeletal: Negative.    Skin: Negative.  Positive for erythema and abscess.   Allergic/Immunologic: Negative.    Neurological: Negative.    Hematologic/Lymphatic: Negative.    Psychiatric/Behavioral: Negative.          Objective:   The physical exam was conducted virtually.    AAO x 3 ; no acute distress noted; appearance normal; mood and behavior normal; thought process normal  Head- normocephalic  Nose- appears normal, no discharge or erythema  Eyes- pupils appear normal in size, no drainage, no erythema  Ears- normal appearing; no discharge, no erythema  Mouth- appears normal  Oropharynx- no erythema, lesions  Lungs- breathing at a normal rate, no acute distress noted  Heart- no reports of tachycardia, palpitations, chest pain  Abdomen- non distended, non tender reported by patient  Skin- warm and dry, no erythema or edema noted by patient or visualized  Psych- as  above; no si/hi      Assessment:     1. Abscess        Plan:       Will start on doxycycline 100 mg po bid x 10 days      Thank you for choosing [facility]!    Our goal in the [facility] is to always provide outstanding medical care. You may receive a survey by mail or e-mail in the next week regarding your experience today. We would greatly appreciate you completing and returning the survey. Your feedback provides us with a way to recognize our staff who provide very good care, and it helps us learn how to improve when your experience was below our aspiration of excellence.         We appreciate you trusting us with your medical care. We hope you feel better soon. We will be happy to take care of you for all of your future medical needs.    You must understand that you've received an Urgent Care treatment only and that you may be released before all your medical problems are known or treated. You, the patient, will arrange for follow up care as instructed.    Follow up with your PCP or specialty clinic as directed in the next 1-2 weeks if not improved or as needed.  You can call [phone] to schedule an appointment with the appropriate provider.    If your condition worsens we recommend that you receive another evaluation in person, with your primary care provider, urgent care or at the emergency room immediately or contact your primary medical clinics after hours call service to discuss your concerns.         Abscess  -     doxycycline (VIBRAMYCIN) 100 MG Cap; Take 1 capsule (100 mg total) by mouth 2 (two) times daily. for 10 days  Dispense: 20 capsule; Refill: 0

## 2025-03-21 ENCOUNTER — HOSPITAL ENCOUNTER (EMERGENCY)
Facility: HOSPITAL | Age: 85
Discharge: HOME | End: 2025-03-21
Attending: EMERGENCY MEDICINE
Payer: COMMERCIAL

## 2025-03-21 VITALS
BODY MASS INDEX: 33.11 KG/M2 | OXYGEN SATURATION: 98 % | TEMPERATURE: 99 F | DIASTOLIC BLOOD PRESSURE: 66 MMHG | HEIGHT: 66 IN | SYSTOLIC BLOOD PRESSURE: 130 MMHG | HEART RATE: 70 BPM | RESPIRATION RATE: 19 BRPM | WEIGHT: 206 LBS

## 2025-03-21 DIAGNOSIS — Z79.01 ANTICOAGULATED: ICD-10-CM

## 2025-03-21 DIAGNOSIS — M54.2 ACUTE NECK PAIN: ICD-10-CM

## 2025-03-21 DIAGNOSIS — S09.8XXA BLUNT HEAD TRAUMA, INITIAL ENCOUNTER: ICD-10-CM
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
## 2025-03-21 DIAGNOSIS — M25.571 ACUTE RIGHT ANKLE PAIN: ICD-10-CM

## 2025-03-21 LAB
ALBUMIN SERPL-MCNC: 4 G/DL (ref 3.4–4.8)
ALBUMIN/GLOB SERPL: 1.3 RATIO (ref 1.1–2)
ALP SERPL-CCNC: 119 UNIT/L (ref 40–150)
ALT SERPL-CCNC: 14 UNIT/L (ref 0–55)
ANION GAP SERPL CALC-SCNC: 13 MEQ/L
APTT PPP: 30.9 SECONDS (ref 23.2–33.7)
AST SERPL-CCNC: 23 UNIT/L (ref 11–45)
BASOPHILS # BLD AUTO: 0.06 X10(3)/MCL
BASOPHILS NFR BLD AUTO: 1.1 %
BILIRUB SERPL-MCNC: 0.9 MG/DL
BUN SERPL-MCNC: 20.6 MG/DL (ref 9.8–20.1)
CALCIUM SERPL-MCNC: 8.9 MG/DL (ref 8.4–10.2)
CHLORIDE SERPL-SCNC: 99 MMOL/L (ref 98–107)
CO2 SERPL-SCNC: 27 MMOL/L (ref 23–31)
CREAT SERPL-MCNC: 1.13 MG/DL (ref 0.55–1.02)
CREAT/UREA NIT SERPL: 18
EOSINOPHIL # BLD AUTO: 0.3 X10(3)/MCL (ref 0–0.9)
EOSINOPHIL NFR BLD AUTO: 5.3 %
ERYTHROCYTE [DISTWIDTH] IN BLOOD BY AUTOMATED COUNT: 12.5 % (ref 11.5–17)
ETHANOL SERPL-MCNC: <10 MG/DL
GFR SERPLBLD CREATININE-BSD FMLA CKD-EPI: 49 ML/MIN/1.73/M2
GLOBULIN SER-MCNC: 3.2 GM/DL (ref 2.4–3.5)
GLUCOSE SERPL-MCNC: 140 MG/DL (ref 82–115)
GROUP & RH: NORMAL
HCT VFR BLD AUTO: 40.6 % (ref 37–47)
HGB BLD-MCNC: 13.8 G/DL (ref 12–16)
IMM GRANULOCYTES # BLD AUTO: 0.01 X10(3)/MCL (ref 0–0.04)
IMM GRANULOCYTES NFR BLD AUTO: 0.2 %
INDIRECT COOMBS: NORMAL
INR PPP: 1.3
LYMPHOCYTES # BLD AUTO: 1.83 X10(3)/MCL (ref 0.6–4.6)
LYMPHOCYTES NFR BLD AUTO: 32.2 %
MCH RBC QN AUTO: 30.7 PG (ref 27–31)
MCHC RBC AUTO-ENTMCNC: 34 G/DL (ref 33–36)
MCV RBC AUTO: 90.4 FL (ref 80–94)
MONOCYTES # BLD AUTO: 0.53 X10(3)/MCL (ref 0.1–1.3)
MONOCYTES NFR BLD AUTO: 9.3 %
NEUTROPHILS # BLD AUTO: 2.96 X10(3)/MCL (ref 2.1–9.2)
NEUTROPHILS NFR BLD AUTO: 51.9 %
NRBC BLD AUTO-RTO: 0 %
PLATELET # BLD AUTO: 199 X10(3)/MCL (ref 130–400)
PMV BLD AUTO: 10.1 FL (ref 7.4–10.4)
POTASSIUM SERPL-SCNC: 3.3 MMOL/L (ref 3.5–5.1)
PROT SERPL-MCNC: 7.2 GM/DL (ref 5.8–7.6)
PROTHROMBIN TIME: 16 SECONDS (ref 12.5–14.5)
RBC # BLD AUTO: 4.49 X10(6)/MCL (ref 4.2–5.4)
SODIUM SERPL-SCNC: 139 MMOL/L (ref 136–145)
SPECIMEN OUTDATE: NORMAL
WBC # BLD AUTO: 5.69 X10(3)/MCL (ref 4.5–11.5)

## 2025-03-21 PROCEDURE — 25000003 PHARM REV CODE 250: Performed by: EMERGENCY MEDICINE

## 2025-03-21 PROCEDURE — 82077 ASSAY SPEC XCP UR&BREATH IA: CPT | Performed by: EMERGENCY MEDICINE

## 2025-03-21 PROCEDURE — 99285 EMERGENCY DEPT VISIT HI MDM: CPT | Mod: 25

## 2025-03-21 PROCEDURE — G0390 TRAUMA RESPONS W/HOSP CRITI: HCPCS

## 2025-03-21 PROCEDURE — 85610 PROTHROMBIN TIME: CPT | Performed by: EMERGENCY MEDICINE

## 2025-03-21 PROCEDURE — 85730 THROMBOPLASTIN TIME PARTIAL: CPT | Performed by: EMERGENCY MEDICINE

## 2025-03-21 PROCEDURE — 80053 COMPREHEN METABOLIC PANEL: CPT | Performed by: EMERGENCY MEDICINE

## 2025-03-21 PROCEDURE — 99284 EMERGENCY DEPT VISIT MOD MDM: CPT | Mod: ,,, | Performed by: NURSE PRACTITIONER

## 2025-03-21 PROCEDURE — 86901 BLOOD TYPING SEROLOGIC RH(D): CPT | Performed by: EMERGENCY MEDICINE

## 2025-03-21 PROCEDURE — 85025 COMPLETE CBC W/AUTO DIFF WBC: CPT | Performed by: EMERGENCY MEDICINE

## 2025-03-21 RX ORDER — HYDROCODONE BITARTRATE AND ACETAMINOPHEN 10; 325 MG/1; MG/1
1 TABLET ORAL
Refills: 0 | Status: COMPLETED | OUTPATIENT
Start: 2025-03-21 | End: 2025-03-21

## 2025-03-21 RX ADMIN — HYDROCODONE BITARTRATE AND ACETAMINOPHEN 1 TABLET: 10; 325 TABLET ORAL at 07:03

## 2025-03-21 NOTE — CONSULTS
"   Trauma Surgery   Activation Note    Patient Name: Janee Costello  MRN: [id number]   YOB: 1940  Date: 03/21/2025    LEVEL 2 TRAUMA     Subjective:   History of present illness: Patient is an approximately 84-year-old female on Eliquis who fell out of bed this morning.  She complains of right ankle pain.  There is a chronic appearing right ankle deformity noted.  She reports back pain but states this is chronic and no worse than her baseline.    Primary Survey:  A Patent. Speaking.    B Normal WOB. No SMITH.   C No active bleeding requiring intervention. Pulses intact.    D GCS 15(E 4, V 5, M 6)    E exposed, log-rolled and examined (see below)   F See below     VITAL SIGNS: 24 HR MIN & MAX LAST   Temp  Min: 98.6 °F (37 °C)  Max: 98.6 °F (37 °C)  98.6 °F (37 °C)   BP  Min: 130/66  Max: 215/86  130/66    Pulse  Min: 70  Max: 82  70    Resp  Min: 12  Max: 20  19    SpO2  Min: 94 %  Max: 98 %  98 %      HT: 5' 6" (167.6 cm)  WT: 93.4 kg (206 lb)  BMI: 33.3     FAST: deferred    Medications/transfusions received en-route:  None  Medications/transfusions received in trauma bay:  None    Scheduled Meds:  Continuous Infusions:  PRN Meds:    ROS: 12 point ROS negative except as stated in HPI    PMH:  AFib, CAD, diabetes, GERD, hyperlipidemia, hypertension, cervical spinal fusion, spinal cord stimulator    Objective:   Secondary Survey:   General: Well developed, well nourished, no acute distress, AAOx3  Neuro: CNII-XII grossly intact  HEENT:  Normocephalic, atraumatic, PERRL, cervical collar in place  CV:  RRR  Pulse: 2+ RP b/l, 2+ DP b/l   Resp:  Non-labored breathing, satting well on room air  GI:  Abdomen soft, non-tender, non-distended  Rectal: Normal tone  Extremities: Moves all 4 spontaneously and purposefully, chronic appearing right ankle deformity  Back/Spine: No bony TTP, no palpable step offs or deformities.  Cervical back: Normal. No tenderness.  Thoracic back: Normal. No tenderness.  Lumbar back: " Normal. No tenderness.  Skin/wounds:  Warm, well perfused, no wounds  Psych: Normal mood and affect.    Labs:  Unremarkable except for mild hypokalemia and mildly elevated creatinine, management per ED    Imaging:  Imaging Results              CT Cervical Spine Without Contrast (Preliminary result)  Result time 03/21/25 05:49:53      Preliminary result by Kyle Pelayo MD (03/21/25 05:49:53)                   Narrative:    START OF REPORT:  Technique: CT of the cervical spine was performed without intravenous contrast with axial as well as sagittal and coronal images.    Comparison: None.    Dosage Information: Automated Exposure Control was utilized 1473.10 mGy.cm.    Clinical history: Trauma level 2, fall on blood thinners, posterior head trauma, chronic neck pain (unchanged since fall per patient).    Findings:  Lung apices: The visualized lung apices appear unremarkable.  Spine:  Spinal canal: The spinal canal appears unremarkable.  Mineralization: Within normal limits for age.  Rotation: No significant rotation is seen.  Scoliosis: No significant scoliosis is seen.  Vertebral Fusion: Chronic appearing post surgical bony fusion is seen at C4-C5 through C6-C7.  Listhesis: No significant listhesis is identified.  Lordosis: Degenerative and post surgical straightening of the normal cervical lordosis is seen.  Intervertebral disc spaces: Multilevel loss of disc height is seen. Severely decreased disc height is seen at C4-C5 through C6-C7.  Osteophytes: Mild multilevel endplate osteophytes are seen.  Endplate Sclerosis: Mild multilevel endplate sclerosis is seen.  Uncovertebral degenerative changes: Moderate multilevel uncovertebral joint arthrosis is seen.  Facet degenerative changes: Pronounced multilevel facet degenerative changes are seen.  Fractures: No acute cervical spine fracture dislocation or subluxation is seen.  Orthopedic Hardware: Spinal fixation hardware is present at C4 through C7 with no findings  of device loosening or migration.    Miscellaneous:  Mastoid air cells: The visualized mastoid air cells appear clear.  Soft Tissues: Unremarkable.      Impression:  1. No acute cervical spine fracture dislocation or subluxation is seen.  2. Post surgical changes, degenerative changes and other details as above.                                         CT Head Without Contrast (Preliminary result)  Result time 03/21/25 05:43:44      Preliminary result by Kyle Pelayo MD (03/21/25 05:43:44)                   Narrative:    START OF REPORT:  Technique: CT of the head was performed without intravenous contrast with axial as well as coronal and sagittal images.    Comparison: None.    Dosage Information: Automated exposure control was utilized.    Clinical history: Trauma level 2, fall on blood thinners, posterior head trauma, chronic neck pain (unchanged since fall per patient).    Findings:  Hemorrhage: No acute intracranial hemorrhage is seen.  CSF spaces: The ventricles, sulci and basal cisterns all appear mildly prominent consistent with global cerebral atrophy.  Brain parenchyma: There is preservation of the grey white junction throughout. No acute infarct is identified. Mild scattered microvascular change is seen in portions of the periventricular and deep white matter tracts.  Cerebellum: Unremarkable.  Vascular: Moderate atheromatous calcification of the intracranial arteries is seen.  Sella and skull base: The sella appears to be within normal limits for age.  Intracranial calcifications: Incidental note is made of bilateral choroid plexus calcification. Incidental note is made of some pineal region calcification.  Calvarium: No acute linear or depressed skull fracture is seen.    Maxillofacial Structures:  Paranasal sinuses: The visualized paranasal sinuses appear clear with no mucoperiosteal thickening or air fluid levels identified.  Orbits: The orbits appear unremarkable.  Zygomatic arches: The zygomatic  arches are intact and unremarkable.  Temporal bones and mastoids: The temporal bones and mastoids appear unremarkable.  TMJ: The mandibular condyles appear normally placed with respect to the mandibular fossa.      Impression:  1. No acute intracranial traumatic injury identified. Details as above.                                         X-Ray Ankle Complete Right (In process)                      X-Ray Pelvis Routine AP (In process)                      X-Ray Chest 1 View (In process)                       Assessment & Plan:   Fall out of bed   -imaging and lab per my review negative for acute injury, illness, or fracture   -chronic appearing right ankle deformity  -back pain at patient's baseline    Dispo per ED

## 2025-03-21 NOTE — ED PROVIDER NOTES
Encounter Date: 3/21/2025    SCRIBE #1 NOTE: I, Isacc Oleary, am scribing for, and in the presence of,  Rhianna Lipscomb MD. I have scribed the following portions of the note - Other sections scribed: HPI, ROS, PE.       History   No chief complaint on file.    Patient is an 84 y.o. female with a PMHx of a-fib, CAD, DM, GERD, HLD, HTN, and a cervical spinal fusion presenting to the ED as a level 2 trauma for a fall on thinners. EMS reports the patient was getting out of bed and her foot slipped causing her to fall backwards and hit the back of her head. EMS says the patient denies any LOC. Patient is on eliquis and has a stimulator in her back.     Patient complains of head, neck, and right ankle pain. She also mentions that she has chronic pain in these areas as well.     The history is provided by the patient and the EMS personnel. No  was used.     Review of patient's allergies indicates:   Allergen Reactions    Lisinopril Swelling    Sulfa (sulfonamide antibiotics) Other (See Comments) and Rash     Facial redness  Skin turned bright red       Past Medical History:   Diagnosis Date    A-fib     Anticoagulant long-term use     Arthritis     Cold intolerance     patient reports she gets rash if cold therapy used for extended periods    Coronary artery disease 2010    Diabetes mellitus     GERD (gastroesophageal reflux disease)     Hyperlipidemia     Hypertension     Insulin pump in place     Lumbar herniated disc     Thyroid disease      Past Surgical History:   Procedure Laterality Date    APPENDECTOMY      CORONARY STENT PLACEMENT      EYE SURGERY      HYSTERECTOMY      KIDNEY STONE SURGERY      KNEE SURGERY      LUMBAR LAMINECTOMY  07/12/2022    Dr. Jason Jorge    NECK SURGERY       Family History   Problem Relation Name Age of Onset    Heart disease Mother      Cancer Brother      Hypertension Daughter      Diabetes Daughter      Hyperlipidemia Daughter       Social History[1]  Review of  Systems   Musculoskeletal:  Positive for neck pain.   Neurological:  Positive for headaches.       Physical Exam     Initial Vitals [03/21/25 0450]   BP Pulse Resp Temp SpO2   (!) 215/86 82 18 98.6 °F (37 °C) 95 %      MAP       --         Physical Exam    Nursing note and vitals reviewed.  Constitutional: She appears well-developed and well-nourished. She is not diaphoretic. She does not appear ill. No distress. Cervical collar in place.   Airway intact   HENT:   Head: Normocephalic and atraumatic.   Right Ear: Tympanic membrane and external ear normal. No hemotympanum.   Left Ear: Tympanic membrane and external ear normal. No hemotympanum.   Nose: No nasal deformity, septal deviation or nasal septal hematoma. Mouth/Throat: Oropharynx is clear and moist and mucous membranes are normal.   Hematoma to right occipital    Eyes: Conjunctivae and EOM are normal. Pupils are equal, round, and reactive to light.   Pupils 3-2 mm bilaterally   Neck: Neck supple. No tracheal deviation present.   No cervical spine tenderness    Normal range of motion.  Cardiovascular:  Normal rate, regular rhythm, normal heart sounds, intact distal pulses and normal pulses.           Pulses:       Radial pulses are 2+ on the right side and 2+ on the left side.        Dorsalis pedis pulses are 2+ on the right side and 2+ on the left side.   2+ radial and DP pulses    Pulmonary/Chest: Breath sounds normal. No respiratory distress. She exhibits no tenderness.   No signs of trauma, breath sounds clear   Abdominal: Abdomen is soft. She exhibits no distension. There is no abdominal tenderness.   No signs of trauma    No right CVA tenderness.  No left CVA tenderness. There is no rebound.   Musculoskeletal:      Cervical back: Normal range of motion and neck supple. No spinous process tenderness or muscular tenderness.      Thoracic back: Normal. No bony tenderness.      Lumbar back: Normal. No bony tenderness.      Comments: No new cervical, thoracic,  or lumbar spinal tenderness to palpation compared to baseline, medial right ankle tenderness to palpation with small contusion      Neurological: She is alert and oriented to person, place, and time. She has normal strength. No cranial nerve deficit or sensory deficit. GCS score is 15. GCS eye subscore is 4. GCS verbal subscore is 5. GCS motor subscore is 6.   Skin: Skin is warm and dry. Capillary refill takes less than 2 seconds. No abrasion, no ecchymosis and no laceration noted. No pallor.   Psychiatric: She has a normal mood and affect. Her behavior is normal.         ED Course   Procedures  Labs Reviewed   COMPREHENSIVE METABOLIC PANEL - Abnormal       Result Value    Sodium 139      Potassium 3.3 (*)     Chloride 99      CO2 27      Glucose 140 (*)     Blood Urea Nitrogen 20.6 (*)     Creatinine 1.13 (*)     Calcium 8.9      Protein Total 7.2      Albumin 4.0      Globulin 3.2      Albumin/Globulin Ratio 1.3      Bilirubin Total 0.9            ALT 14      AST 23      eGFR 49      Anion Gap 13.0      BUN/Creatinine Ratio 18     PROTIME-INR - Abnormal    PT 16.0 (*)     INR 1.3      Narrative:     Protimes are used to monitor anticoagulant agents such as warfarin. PT INR values are based on the current patient normal mean and the JAMES value for the specific instrument reagent used.  **Routine theraputic target values for the INR are 2.0-3.0**   APTT - Normal    PTT 30.9     ALCOHOL,MEDICAL (ETHANOL) - Normal    Ethanol Level <10.0     CBC W/ AUTO DIFFERENTIAL    Narrative:     The following orders were created for panel order CBC auto differential.  Procedure                               Abnormality         Status                     ---------                               -----------         ------                     CBC with Differential[[id number]]                           Final result                 Please view results for these tests on the individual orders.   CBC WITH DIFFERENTIAL    WBC 5.69       RBC 4.49      Hgb 13.8      Hct 40.6      MCV 90.4      MCH 30.7      MCHC 34.0      RDW 12.5      Platelet 199      MPV 10.1      Neut % 51.9      Lymph % 32.2      Mono % 9.3      Eos % 5.3      Basophil % 1.1      Imm Grans % 0.2      Neut # 2.96      Lymph # 1.83      Mono # 0.53      Eos # 0.30      Baso # 0.06      Imm Gran # 0.01      NRBC% 0.0     URINALYSIS, REFLEX TO URINE CULTURE   DRUG SCREEN, URINE (BEAKER)   TYPE & SCREEN    Group & Rh A POS      Indirect Phillip GEL NEG      Specimen Outdate 03/24/2025 23:59            Imaging Results              CT Cervical Spine Without Contrast (Preliminary result)  Result time 03/21/25 05:49:53      Preliminary result by Kyle Pelayo MD (03/21/25 05:49:53)                   Narrative:    START OF REPORT:  Technique: CT of the cervical spine was performed without intravenous contrast with axial as well as sagittal and coronal images.    Comparison: None.    Dosage Information: Automated Exposure Control was utilized 1473.10 mGy.cm.    Clinical history: Trauma level 2, fall on blood thinners, posterior head trauma, chronic neck pain (unchanged since fall per patient).    Findings:  Lung apices: The visualized lung apices appear unremarkable.  Spine:  Spinal canal: The spinal canal appears unremarkable.  Mineralization: Within normal limits for age.  Rotation: No significant rotation is seen.  Scoliosis: No significant scoliosis is seen.  Vertebral Fusion: Chronic appearing post surgical bony fusion is seen at C4-C5 through C6-C7.  Listhesis: No significant listhesis is identified.  Lordosis: Degenerative and post surgical straightening of the normal cervical lordosis is seen.  Intervertebral disc spaces: Multilevel loss of disc height is seen. Severely decreased disc height is seen at C4-C5 through C6-C7.  Osteophytes: Mild multilevel endplate osteophytes are seen.  Endplate Sclerosis: Mild multilevel endplate sclerosis is seen.  Uncovertebral degenerative  changes: Moderate multilevel uncovertebral joint arthrosis is seen.  Facet degenerative changes: Pronounced multilevel facet degenerative changes are seen.  Fractures: No acute cervical spine fracture dislocation or subluxation is seen.  Orthopedic Hardware: Spinal fixation hardware is present at C4 through C7 with no findings of device loosening or migration.    Miscellaneous:  Mastoid air cells: The visualized mastoid air cells appear clear.  Soft Tissues: Unremarkable.      Impression:  1. No acute cervical spine fracture dislocation or subluxation is seen.  2. Post surgical changes, degenerative changes and other details as above.                                         CT Head Without Contrast (Preliminary result)  Result time 03/21/25 05:43:44      Preliminary result by Kyle Pelayo MD (03/21/25 05:43:44)                   Narrative:    START OF REPORT:  Technique: CT of the head was performed without intravenous contrast with axial as well as coronal and sagittal images.    Comparison: None.    Dosage Information: Automated exposure control was utilized.    Clinical history: Trauma level 2, fall on blood thinners, posterior head trauma, chronic neck pain (unchanged since fall per patient).    Findings:  Hemorrhage: No acute intracranial hemorrhage is seen.  CSF spaces: The ventricles, sulci and basal cisterns all appear mildly prominent consistent with global cerebral atrophy.  Brain parenchyma: There is preservation of the grey white junction throughout. No acute infarct is identified. Mild scattered microvascular change is seen in portions of the periventricular and deep white matter tracts.  Cerebellum: Unremarkable.  Vascular: Moderate atheromatous calcification of the intracranial arteries is seen.  Sella and skull base: The sella appears to be within normal limits for age.  Intracranial calcifications: Incidental note is made of bilateral choroid plexus calcification. Incidental note is made of some  pineal region calcification.  Calvarium: No acute linear or depressed skull fracture is seen.    Maxillofacial Structures:  Paranasal sinuses: The visualized paranasal sinuses appear clear with no mucoperiosteal thickening or air fluid levels identified.  Orbits: The orbits appear unremarkable.  Zygomatic arches: The zygomatic arches are intact and unremarkable.  Temporal bones and mastoids: The temporal bones and mastoids appear unremarkable.  TMJ: The mandibular condyles appear normally placed with respect to the mandibular fossa.      Impression:  1. No acute intracranial traumatic injury identified. Details as above.                                         X-Ray Ankle Complete Right (In process)                      X-Ray Pelvis Routine AP (In process)                      X-Ray Chest 1 View (In process)                   X-Rays:   Independently Interpreted Readings:   Other Readings:  CXR: no acute cardiopulmonary process  Pelvis XR: no acute fracture or dislocation   XR right ankle: no acute fracture or dislocation     Medications   HYDROcodone-acetaminophen  mg per tablet 1 tablet (has no administration in time range)     Medical Decision Making  Differential diagnosis includes but is not limited to ICH, cervical fracture, spinal cord injury, ankle fracture vs dislocation vs sprain      CT normal  XR without acute process  Ace wrap to ankle      Problems Addressed:  Acute neck pain: acute illness or injury that poses a threat to life or bodily functions  Acute right ankle pain: acute illness or injury that poses a threat to life or bodily functions  Anticoagulated: chronic illness or injury  Blunt head trauma, initial encounter: acute illness or injury that poses a threat to life or bodily functions  Fall from standing, initial encounter: acute illness or injury that poses a threat to life or bodily functions    Amount and/or Complexity of Data Reviewed  Independent Historian: EMS     Details: See  HPI  Labs: ordered. Decision-making details documented in ED Course.  Radiology: ordered and independent interpretation performed. Decision-making details documented in ED Course.    Risk  Prescription drug management.            Scribe Attestation:   Scribe #1: I performed the above scribed service and the documentation accurately describes the services I performed. I attest to the accuracy of the note.    Attending Attestation:           Physician Attestation for Scribe:  Physician Attestation Statement for Scribe #1: I, Rhianna Lipscomb MD, reviewed documentation, as scribed by Isacc Oleary in my presence, and it is both accurate and complete.                                    Clinical Impression:  Final diagnoses:  [M25.571] Acute right ankle pain  [W19.XXXA] Fall from standing, initial encounter (Primary)  [M54.2] Acute neck pain  [S09.8XXA] Blunt head trauma, initial encounter  [Z79.01] Anticoagulated          ED Disposition Condition    Discharge Stable          ED Prescriptions    None       Follow-up Information       Follow up With Specialties Details Why Contact Info    [facility] - Emergency Dept Emergency Medicine  As needed, If symptoms worsen [facility] [id number]  [phone]                 [1]   Social History  Tobacco Use    Smoking status: Never    Smokeless tobacco: Never   Substance Use Topics    Alcohol use: No        Rhianna Lipscomb MD  03/21/25 0661